# Patient Record
Sex: MALE | Race: WHITE | NOT HISPANIC OR LATINO | Employment: FULL TIME | URBAN - METROPOLITAN AREA
[De-identification: names, ages, dates, MRNs, and addresses within clinical notes are randomized per-mention and may not be internally consistent; named-entity substitution may affect disease eponyms.]

---

## 2023-12-25 ENCOUNTER — HOSPITAL ENCOUNTER (EMERGENCY)
Facility: HOSPITAL | Age: 68
Discharge: HOME | End: 2023-12-25
Attending: EMERGENCY MEDICINE
Payer: COMMERCIAL

## 2023-12-25 ENCOUNTER — APPOINTMENT (EMERGENCY)
Dept: RADIOLOGY | Facility: HOSPITAL | Age: 68
End: 2023-12-25
Payer: COMMERCIAL

## 2023-12-25 VITALS
BODY MASS INDEX: 27.3 KG/M2 | WEIGHT: 195 LBS | SYSTOLIC BLOOD PRESSURE: 172 MMHG | OXYGEN SATURATION: 98 % | RESPIRATION RATE: 12 BRPM | DIASTOLIC BLOOD PRESSURE: 77 MMHG | HEART RATE: 52 BPM | TEMPERATURE: 98 F | HEIGHT: 71 IN

## 2023-12-25 DIAGNOSIS — T07.XXXA MULTIPLE PUNCTURE WOUNDS: ICD-10-CM
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
## 2023-12-25 DIAGNOSIS — T07.XXXA MULTIPLE LACERATIONS: ICD-10-CM

## 2023-12-25 PROCEDURE — 73130 X-RAY EXAM OF HAND: CPT

## 2023-12-25 PROCEDURE — 12002 RPR S/N/AX/GEN/TRNK2.6-7.5CM: CPT | Performed by: PHYSICIAN ASSISTANT

## 2023-12-25 PROCEDURE — 99284 EMERGENCY DEPT VISIT MOD MDM: CPT | Performed by: PHYSICIAN ASSISTANT

## 2023-12-25 PROCEDURE — 99283 EMERGENCY DEPT VISIT LOW MDM: CPT

## 2023-12-25 RX ORDER — AMOXICILLIN AND CLAVULANATE POTASSIUM 875; 125 MG/1; MG/1
1 TABLET, FILM COATED ORAL ONCE
Status: COMPLETED | OUTPATIENT
Start: 2023-12-25 | End: 2023-12-25

## 2023-12-25 RX ORDER — LIDOCAINE HYDROCHLORIDE AND EPINEPHRINE 10; 10 MG/ML; UG/ML
20 INJECTION, SOLUTION INFILTRATION; PERINEURAL ONCE
Status: COMPLETED | OUTPATIENT
Start: 2023-12-25 | End: 2023-12-25

## 2023-12-25 RX ORDER — IBUPROFEN 600 MG/1
600 TABLET ORAL ONCE
Status: COMPLETED | OUTPATIENT
Start: 2023-12-25 | End: 2023-12-25

## 2023-12-25 RX ORDER — AMOXICILLIN AND CLAVULANATE POTASSIUM 875; 125 MG/1; MG/1
1 TABLET, FILM COATED ORAL EVERY 12 HOURS
Qty: 14 TABLET | Refills: 0 | Status: ON HOLD | OUTPATIENT
Start: 2023-12-25 | End: 2023-12-28

## 2023-12-25 RX ADMIN — LIDOCAINE HYDROCHLORIDE,EPINEPHRINE BITARTRATE 20 ML: 10; .01 INJECTION, SOLUTION INFILTRATION; PERINEURAL at 18:11

## 2023-12-25 RX ADMIN — AMOXICILLIN AND CLAVULANATE POTASSIUM 1 TABLET: 875; 125 TABLET, FILM COATED ORAL at 18:12

## 2023-12-25 RX ADMIN — IBUPROFEN 600 MG: 600 TABLET, FILM COATED ORAL at 18:12

## 2023-12-25 NOTE — ED PROVIDER NOTES
History  Chief Complaint   Patient presents with    Dog Bite     Received multpiple  dog bites on both hands.  States dog is utd with vaccines.  States it was his own dog     Past Medical History: Hypertension, Stroke (HCC)  Past Surgical History: HERNIA REPAIR, left wrist surgery with hardware    Patient presents to ED c/o multiple dog bites, punctures, scratches to bilateral hands and wrists, worse on right after family Labrador dog went after the owner's boyfriend, so patient tried to get the dog off him and dog bite patient.  Dog is up-to-date on vaccines  Patient got a tetanus prior to arrival as he went to an urgent care and then was referred to emergency department; they also sent a prescription for Augmentin for patient.  PT has no other injuries or complaints.  Initially washed with H2O2          None       Past Medical History:   Diagnosis Date    Hypertension     Stroke (HCC)        Past Surgical History:   Procedure Laterality Date    HERNIA REPAIR         No family history on file.  I have reviewed and agree with the history as documented.    E-Cigarette/Vaping     E-Cigarette/Vaping Substances          Review of Systems   Constitutional:  Negative for fever.   HENT:  Negative for sore throat.    Respiratory:  Negative for shortness of breath.    Cardiovascular:  Negative for chest pain.   Gastrointestinal:  Negative for abdominal pain and vomiting.   Musculoskeletal:  Positive for arthralgias and myalgias.   Skin:  Positive for wound. Negative for color change and pallor.   Neurological:  Negative for dizziness, weakness and numbness.   All other systems reviewed and are negative.      Physical Exam  Physical Exam  Vitals and nursing note reviewed.   Constitutional:       General: He is in acute distress (mild).      Appearance: He is well-developed.   HENT:      Head: Normocephalic and atraumatic.      Right Ear: External ear normal.      Left Ear: External ear normal.      Nose: Nose normal.       "Mouth/Throat:      Mouth: Mucous membranes are moist.      Pharynx: Oropharynx is clear.   Eyes:      Conjunctiva/sclera: Conjunctivae normal.   Cardiovascular:      Rate and Rhythm: Normal rate.   Pulmonary:      Effort: Pulmonary effort is normal. No respiratory distress.   Musculoskeletal:         General: Swelling, tenderness and signs of injury present. Normal range of motion.      Cervical back: Normal range of motion.   Skin:     General: Skin is warm and dry.      Findings: Lesion present.   Neurological:      Mental Status: He is alert and oriented to person, place, and time.   Psychiatric:         Behavior: Behavior normal.               Vital Signs  ED Triage Vitals [12/25/23 1734]   Temperature Pulse Respirations Blood Pressure SpO2   98 °F (36.7 °C) (!) 52 12 (!) 172/77 98 %      Temp src Heart Rate Source Patient Position - Orthostatic VS BP Location FiO2 (%)   -- Monitor Lying Left arm --      Pain Score       3           Vitals:    12/25/23 1734   BP: (!) 172/77   Pulse: (!) 52   Patient Position - Orthostatic VS: Lying         Visual Acuity      ED Medications  Medications   ibuprofen (MOTRIN) tablet 600 mg (600 mg Oral Given 12/25/23 1812)   amoxicillin-clavulanate (AUGMENTIN) 875-125 mg per tablet 1 tablet (1 tablet Oral Given 12/25/23 1812)   lidocaine-epinephrine (XYLOCAINE/EPINEPHRINE) 1 %-1:100,000 injection 20 mL (20 mL Infiltration Given 12/25/23 1811)       Diagnostic Studies  Results Reviewed       None                   XR hand 3+ views RIGHT   ED Interpretation by Rosa Maria Lopez PA-C (12/25 1925)   No fx no fb      XR hand 3+ views LEFT   ED Interpretation by Rosa Maria Lopez PA-C (12/25 1925)   No fx no fb                 Procedures  Universal Protocol:  Consent: Verbal consent obtained.  Risks and benefits: risks, benefits and alternatives were discussed  Consent given by: patient  Time out: Immediately prior to procedure a \"time out\" was called to verify the correct patient, " procedure, equipment, support staff and site/side marked as required.  Patient understanding: patient states understanding of the procedure being performed  Patient identity confirmed: verbally with patient  Laceration repair    Date/Time: 12/25/2023 7:51 PM    Performed by: Rosa Maria Lopez PA-C  Authorized by: Rosa Maria Lopez PA-C  Location: #3 wounds to right hand and #1 wound to left palm.  Wound length (cm): 5cm total:  R hand: 1cm index finger, 1cm mid dorsal hand, 2 cm dorsal wrist and Left hand 1 cm palm.  Foreign bodies: no foreign bodies  Tendon involvement: none  Nerve involvement: none  Vascular damage: no  Anesthesia: local infiltration    Anesthesia:  Local Anesthetic: lidocaine 1% with epinephrine  Anesthetic total: 3 mL    Sedation:  Patient sedated: no        Procedure Details:  Preparation: Patient was prepped and draped in the usual sterile fashion.  Irrigation solution: saline  Irrigation method: syringe  Amount of cleaning: extensive  Skin closure: 5-0 Prolene  Number of sutures: 5 (R hand: #4 total (#1 to index, #1 to midhand and #2 to wrist) and L hand #1)  Technique: simple  Approximation: loose  Approximation difficulty: simple  Dressing: gauze roll  Patient tolerance: patient tolerated the procedure well with no immediate complications               ED Course                               SBIRT 22yo+      Flowsheet Row Most Recent Value   Initial Alcohol Screen: US AUDIT-C     1. How often do you have a drink containing alcohol? 0 Filed at: 12/25/2023 1736   2. How many drinks containing alcohol do you have on a typical day you are drinking?  0 Filed at: 12/25/2023 1736   3a. Male UNDER 65: How often do you have five or more drinks on one occasion? 0 Filed at: 12/25/2023 1736   Audit-C Score 0 Filed at: 12/25/2023 1736   [place]: How many times in the past year have you...    Used an illegal drug or used a prescription medication for non-medical reasons? Never Filed at: 12/25/2023 1736                       Medical Decision Making  Right handed  Patient with multiple dog bites, punctures, lacerations, abrasions  Wounds cleaned, deeper lacerations copiously irrigated.  Loosely sutured to aid in healing but also allow drainage.  Strict wound care precautions given to patient, follow-up with PCP for wound check in 2 to 3 days  No indication for rabies at this time    Amount and/or Complexity of Data Reviewed  Radiology: ordered and independent interpretation performed. Decision-making details documented in ED Course.    Risk  OTC drugs.  Prescription drug management.             Disposition  Final diagnoses:   Dog bite, initial encounter - B/L hands   Multiple lacerations   Multiple puncture wounds     Time reflects when diagnosis was documented in both MDM as applicable and the Disposition within this note       Time User Action Codes Description Comment    12/25/2023  7:53 PM Rosa Maria Lopez [W54.0XXA] Dog bite, initial encounter     12/25/2023  7:53 PM Rosa Maria Lopez Modify [W54.0XXA] Dog bite, initial encounter B/L hands    12/25/2023  7:54 PM Rosa Maria Lopez [T07.XXXA] Multiple lacerations     12/25/2023  7:54 PM Rosa Maria Lopez [T07.XXXA] Multiple puncture wounds           ED Disposition       ED Disposition   Discharge    Condition   Stable    Date/Time   Mon Dec 25, 2023 1953    Comment   Raji Goldman discharge to home/self care.                   Follow-up Information       Follow up With Specialties Details Why Contact Info    Your PCP   For wound re-check in 2-3 days and then in 5 days for suture removal             Patient's Medications   Discharge Prescriptions    AMOXICILLIN-CLAVULANATE (AUGMENTIN) 875-125 MG PER TABLET    Take 1 tablet by mouth every 12 (twelve) hours for 7 days       Start Date: 12/25/2023End Date: 1/1/2024       Order Dose: 1 tablet       Quantity: 14 tablet    Refills: 0       No discharge procedures on file.    PDMP Review       None            ED  Provider  Electronically Signed by             Rosa Maria Lopez PA-C  12/25/23 2009

## 2023-12-26 NOTE — DISCHARGE INSTRUCTIONS
Use Tylenol 650 mg every 4 hours or Motrin 600 mg every 6 hours; you can alternate the 2 medications taking something every 3 hours for pain as needed.    Take all oral antibiotics until done.    Wound check in 2-3 days, then in 5 days for suture removal    Remove dressing in 24 hours, then wash gently with soap and water pat drying keep covered with antibiotic ointment and dressing.    Return to ED if worsening pain, redness, purulent discharge or worsening condition or any concerns

## 2023-12-28 ENCOUNTER — ANESTHESIA EVENT (EMERGENCY)
Dept: PERIOP | Facility: HOSPITAL | Age: 68
End: 2023-12-28
Payer: COMMERCIAL

## 2023-12-28 ENCOUNTER — HOSPITAL ENCOUNTER (EMERGENCY)
Facility: HOSPITAL | Age: 68
Discharge: HOME | End: 2023-12-28
Attending: EMERGENCY MEDICINE
Payer: COMMERCIAL

## 2023-12-28 ENCOUNTER — APPOINTMENT (EMERGENCY)
Dept: RADIOLOGY | Facility: HOSPITAL | Age: 68
End: 2023-12-28
Payer: COMMERCIAL

## 2023-12-28 ENCOUNTER — ANESTHESIA (EMERGENCY)
Dept: PERIOP | Facility: HOSPITAL | Age: 68
End: 2023-12-28
Payer: COMMERCIAL

## 2023-12-28 ENCOUNTER — HOSPITAL ENCOUNTER (OUTPATIENT)
Facility: HOSPITAL | Age: 68
Setting detail: OUTPATIENT SURGERY
Discharge: HOME | End: 2023-12-30
Attending: EMERGENCY MEDICINE | Admitting: ORTHOPAEDIC SURGERY
Payer: COMMERCIAL

## 2023-12-28 VITALS
DIASTOLIC BLOOD PRESSURE: 56 MMHG | RESPIRATION RATE: 16 BRPM | HEART RATE: 78 BPM | HEIGHT: 71 IN | OXYGEN SATURATION: 95 % | TEMPERATURE: 98 F | BODY MASS INDEX: 26.6 KG/M2 | SYSTOLIC BLOOD PRESSURE: 123 MMHG | WEIGHT: 190 LBS

## 2023-12-28 DIAGNOSIS — S52.209A ULNAR FRACTURE: ICD-10-CM

## 2023-12-28 DIAGNOSIS — S41.112A LACERATION OF ARM, LEFT, MULTIPLE SITES, INITIAL ENCOUNTER: ICD-10-CM
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
## 2023-12-28 DIAGNOSIS — S52.692B: Primary | ICD-10-CM

## 2023-12-28 DIAGNOSIS — S41.159A DOG BITE OF ARM: Primary | ICD-10-CM

## 2023-12-28 DIAGNOSIS — S41.109A: ICD-10-CM

## 2023-12-28 PROBLEM — S52.202B TYPE I OR II OPEN FRACTURE OF LEFT ULNA: Status: ACTIVE | Noted: 2023-12-28

## 2023-12-28 LAB
ANION GAP SERPL CALCULATED.3IONS-SCNC: 9 MMOL/L
BASOPHILS # BLD AUTO: 0.01 THOUSANDS/ÂΜL (ref 0–0.1)
BASOPHILS NFR BLD AUTO: 0 % (ref 0–1)
BUN SERPL-MCNC: 17 MG/DL (ref 5–25)
CALCIUM SERPL-MCNC: 9.1 MG/DL (ref 8.4–10.2)
CHLORIDE SERPL-SCNC: 105 MMOL/L (ref 96–108)
CO2 SERPL-SCNC: 26 MMOL/L (ref 21–32)
CREAT SERPL-MCNC: 1.01 MG/DL (ref 0.6–1.3)
EOSINOPHIL # BLD AUTO: 0.01 THOUSAND/ÂΜL (ref 0–0.61)
EOSINOPHIL NFR BLD AUTO: 0 % (ref 0–6)
ERYTHROCYTE [DISTWIDTH] IN BLOOD BY AUTOMATED COUNT: 13.8 % (ref 11.6–15.1)
GFR SERPL CREATININE-BSD FRML MDRD: 77 ML/MIN/1.73SQ M
GLUCOSE SERPL-MCNC: 155 MG/DL (ref 65–140)
HCT VFR BLD AUTO: 38.2 % (ref 36.5–49.3)
HGB BLD-MCNC: 12.8 G/DL (ref 12–17)
IMM GRANULOCYTES # BLD AUTO: 0.02 THOUSAND/UL (ref 0–0.2)
IMM GRANULOCYTES NFR BLD AUTO: 0 % (ref 0–2)
LYMPHOCYTES # BLD AUTO: 0.57 THOUSANDS/ÂΜL (ref 0.6–4.47)
LYMPHOCYTES NFR BLD AUTO: 6 % (ref 14–44)
MCH RBC QN AUTO: 30 PG (ref 26.8–34.3)
MCHC RBC AUTO-ENTMCNC: 33.5 G/DL (ref 31.4–37.4)
MCV RBC AUTO: 90 FL (ref 82–98)
MONOCYTES # BLD AUTO: 0.38 THOUSAND/ÂΜL (ref 0.17–1.22)
MONOCYTES NFR BLD AUTO: 4 % (ref 4–12)
NEUTROPHILS # BLD AUTO: 8.8 THOUSANDS/ÂΜL (ref 1.85–7.62)
NEUTS SEG NFR BLD AUTO: 90 % (ref 43–75)
NRBC BLD AUTO-RTO: 0 /100 WBCS
PLATELET # BLD AUTO: 186 THOUSANDS/UL (ref 149–390)
PMV BLD AUTO: 8.7 FL (ref 8.9–12.7)
POTASSIUM SERPL-SCNC: 3.6 MMOL/L (ref 3.5–5.3)
RBC # BLD AUTO: 4.26 MILLION/UL (ref 3.88–5.62)
SODIUM SERPL-SCNC: 140 MMOL/L (ref 135–147)
WBC # BLD AUTO: 9.79 THOUSAND/UL (ref 4.31–10.16)

## 2023-12-28 PROCEDURE — C1713 ANCHOR/SCREW BN/BN,TIS/BN: HCPCS | Performed by: ORTHOPAEDIC SURGERY

## 2023-12-28 PROCEDURE — 73110 X-RAY EXAM OF WRIST: CPT

## 2023-12-28 PROCEDURE — 25652 OPTX ULNAR STYLOID FRACTURE: CPT | Performed by: ORTHOPAEDIC SURGERY

## 2023-12-28 PROCEDURE — 99223 1ST HOSP IP/OBS HIGH 75: CPT | Performed by: ORTHOPAEDIC SURGERY

## 2023-12-28 PROCEDURE — 85025 COMPLETE CBC W/AUTO DIFF WBC: CPT | Performed by: EMERGENCY MEDICINE

## 2023-12-28 PROCEDURE — 99283 EMERGENCY DEPT VISIT LOW MDM: CPT

## 2023-12-28 PROCEDURE — 73100 X-RAY EXAM OF WRIST: CPT

## 2023-12-28 PROCEDURE — 73090 X-RAY EXAM OF FOREARM: CPT

## 2023-12-28 PROCEDURE — 12031 INTMD RPR S/A/T/EXT 2.5 CM/<: CPT | Performed by: ORTHOPAEDIC SURGERY

## 2023-12-28 PROCEDURE — 99223 1ST HOSP IP/OBS HIGH 75: CPT | Performed by: INTERNAL MEDICINE

## 2023-12-28 PROCEDURE — 96365 THER/PROPH/DIAG IV INF INIT: CPT

## 2023-12-28 PROCEDURE — 99285 EMERGENCY DEPT VISIT HI MDM: CPT | Performed by: EMERGENCY MEDICINE

## 2023-12-28 PROCEDURE — 12031 INTMD RPR S/A/T/EXT 2.5 CM/<: CPT | Performed by: PHYSICIAN ASSISTANT

## 2023-12-28 PROCEDURE — 99291 CRITICAL CARE FIRST HOUR: CPT | Performed by: EMERGENCY MEDICINE

## 2023-12-28 PROCEDURE — 36415 COLL VENOUS BLD VENIPUNCTURE: CPT | Performed by: EMERGENCY MEDICINE

## 2023-12-28 PROCEDURE — 25652 OPTX ULNAR STYLOID FRACTURE: CPT | Performed by: PHYSICIAN ASSISTANT

## 2023-12-28 PROCEDURE — C9290 INJ, BUPIVACAINE LIPOSOME: HCPCS | Performed by: STUDENT IN AN ORGANIZED HEALTH CARE EDUCATION/TRAINING PROGRAM

## 2023-12-28 PROCEDURE — 80048 BASIC METABOLIC PNL TOTAL CA: CPT | Performed by: EMERGENCY MEDICINE

## 2023-12-28 PROCEDURE — 12002 RPR S/N/AX/GEN/TRNK2.6-7.5CM: CPT | Performed by: EMERGENCY MEDICINE

## 2023-12-28 DEVICE — NARROW LOCKING T-PLATE, 2.4
Type: IMPLANTABLE DEVICE | Site: WRIST | Status: FUNCTIONAL
Brand: VARIAX

## 2023-12-28 DEVICE — BONE SCREW, FULLY THREADED, T8
Type: IMPLANTABLE DEVICE | Site: WRIST | Status: FUNCTIONAL
Brand: VARIAX

## 2023-12-28 DEVICE — BIOACTIVE BONE GRAFT SUBSTITUTE, FOAM PACK; BETA-TRICALCIUM PHOSPHATE, TYPE I BOVINE COLLAGEN, AND BIOACTIVE GLASS
Type: IMPLANTABLE DEVICE | Site: WRIST | Status: FUNCTIONAL
Brand: VITOSS BBTRAUMA

## 2023-12-28 DEVICE — GRAFT BONE CANCELLOUS CHIPS 30ML: Type: IMPLANTABLE DEVICE | Site: WRIST | Status: FUNCTIONAL

## 2023-12-28 DEVICE — LOCKING SCREW, FULLY THREADED,T8
Type: IMPLANTABLE DEVICE | Site: WRIST | Status: FUNCTIONAL
Brand: VARIAX

## 2023-12-28 RX ORDER — OXYCODONE HYDROCHLORIDE 5 MG/1
5 TABLET ORAL EVERY 4 HOURS PRN
Status: DISCONTINUED | OUTPATIENT
Start: 2023-12-28 | End: 2023-12-30 | Stop reason: HOSPADM

## 2023-12-28 RX ORDER — OXYCODONE HYDROCHLORIDE 5 MG/1
5 TABLET ORAL EVERY 4 HOURS PRN
Qty: 30 TABLET | Refills: 0 | Status: SHIPPED | OUTPATIENT
Start: 2023-12-28 | End: 2024-01-02

## 2023-12-28 RX ORDER — EPHEDRINE SULFATE 50 MG/ML
INJECTION INTRAVENOUS AS NEEDED
Status: DISCONTINUED | OUTPATIENT
Start: 2023-12-28 | End: 2023-12-28

## 2023-12-28 RX ORDER — CHLORHEXIDINE GLUCONATE ORAL RINSE 1.2 MG/ML
15 SOLUTION DENTAL ONCE
Status: COMPLETED | OUTPATIENT
Start: 2023-12-28 | End: 2023-12-28

## 2023-12-28 RX ORDER — MAGNESIUM HYDROXIDE/ALUMINUM HYDROXICE/SIMETHICONE 120; 1200; 1200 MG/30ML; MG/30ML; MG/30ML
30 SUSPENSION ORAL EVERY 6 HOURS PRN
Status: DISCONTINUED | OUTPATIENT
Start: 2023-12-28 | End: 2023-12-30 | Stop reason: HOSPADM

## 2023-12-28 RX ORDER — AMOXICILLIN AND CLAVULANATE POTASSIUM 875; 125 MG/1; MG/1
1 TABLET, FILM COATED ORAL EVERY 12 HOURS
Qty: 14 TABLET | Refills: 0 | Status: SHIPPED | OUTPATIENT
Start: 2023-12-28 | End: 2023-12-30

## 2023-12-28 RX ORDER — CHLORHEXIDINE GLUCONATE 4 G/100ML
SOLUTION TOPICAL DAILY PRN
Status: DISCONTINUED | OUTPATIENT
Start: 2023-12-28 | End: 2023-12-28 | Stop reason: HOSPADM

## 2023-12-28 RX ORDER — DEXAMETHASONE SODIUM PHOSPHATE 10 MG/ML
INJECTION, SOLUTION INTRAMUSCULAR; INTRAVENOUS AS NEEDED
Status: DISCONTINUED | OUTPATIENT
Start: 2023-12-28 | End: 2023-12-28

## 2023-12-28 RX ORDER — LIDOCAINE HYDROCHLORIDE 10 MG/ML
INJECTION, SOLUTION EPIDURAL; INFILTRATION; INTRACAUDAL; PERINEURAL AS NEEDED
Status: DISCONTINUED | OUTPATIENT
Start: 2023-12-28 | End: 2023-12-28

## 2023-12-28 RX ORDER — MAGNESIUM HYDROXIDE 1200 MG/15ML
LIQUID ORAL AS NEEDED
Status: DISCONTINUED | OUTPATIENT
Start: 2023-12-28 | End: 2023-12-28 | Stop reason: HOSPADM

## 2023-12-28 RX ORDER — PROMETHAZINE HYDROCHLORIDE 25 MG/ML
6.25 INJECTION, SOLUTION INTRAMUSCULAR; INTRAVENOUS ONCE AS NEEDED
Status: DISCONTINUED | OUTPATIENT
Start: 2023-12-28 | End: 2023-12-28 | Stop reason: HOSPADM

## 2023-12-28 RX ORDER — ROCURONIUM BROMIDE 10 MG/ML
INJECTION, SOLUTION INTRAVENOUS AS NEEDED
Status: DISCONTINUED | OUTPATIENT
Start: 2023-12-28 | End: 2023-12-28

## 2023-12-28 RX ORDER — PROPOFOL 10 MG/ML
INJECTION, EMULSION INTRAVENOUS AS NEEDED
Status: DISCONTINUED | OUTPATIENT
Start: 2023-12-28 | End: 2023-12-28

## 2023-12-28 RX ORDER — ACETAMINOPHEN 325 MG/1
650 TABLET ORAL ONCE
Status: COMPLETED | OUTPATIENT
Start: 2023-12-28 | End: 2023-12-28

## 2023-12-28 RX ORDER — ACETAMINOPHEN 325 MG/1
650 TABLET ORAL EVERY 6 HOURS PRN
Status: DISCONTINUED | OUTPATIENT
Start: 2023-12-28 | End: 2023-12-29 | Stop reason: SDUPTHER

## 2023-12-28 RX ORDER — SODIUM CHLORIDE, SODIUM LACTATE, POTASSIUM CHLORIDE, CALCIUM CHLORIDE 600; 310; 30; 20 MG/100ML; MG/100ML; MG/100ML; MG/100ML
INJECTION, SOLUTION INTRAVENOUS CONTINUOUS PRN
Status: DISCONTINUED | OUTPATIENT
Start: 2023-12-28 | End: 2023-12-28

## 2023-12-28 RX ORDER — BUPIVACAINE HYDROCHLORIDE 5 MG/ML
INJECTION, SOLUTION EPIDURAL; INTRACAUDAL
Status: COMPLETED | OUTPATIENT
Start: 2023-12-28 | End: 2023-12-28

## 2023-12-28 RX ORDER — SUCCINYLCHOLINE/SOD CL,ISO/PF 100 MG/5ML
SYRINGE (ML) INTRAVENOUS AS NEEDED
Status: DISCONTINUED | OUTPATIENT
Start: 2023-12-28 | End: 2023-12-28

## 2023-12-28 RX ORDER — MIDAZOLAM HYDROCHLORIDE 2 MG/2ML
INJECTION, SOLUTION INTRAMUSCULAR; INTRAVENOUS AS NEEDED
Status: DISCONTINUED | OUTPATIENT
Start: 2023-12-28 | End: 2023-12-28

## 2023-12-28 RX ORDER — VANCOMYCIN HYDROCHLORIDE 1 G/20ML
INJECTION, POWDER, LYOPHILIZED, FOR SOLUTION INTRAVENOUS AS NEEDED
Status: DISCONTINUED | OUTPATIENT
Start: 2023-12-28 | End: 2023-12-28 | Stop reason: HOSPADM

## 2023-12-28 RX ORDER — ONDANSETRON 2 MG/ML
4 INJECTION INTRAMUSCULAR; INTRAVENOUS EVERY 6 HOURS PRN
Status: DISCONTINUED | OUTPATIENT
Start: 2023-12-28 | End: 2023-12-30 | Stop reason: HOSPADM

## 2023-12-28 RX ORDER — ONDANSETRON 2 MG/ML
INJECTION INTRAMUSCULAR; INTRAVENOUS AS NEEDED
Status: DISCONTINUED | OUTPATIENT
Start: 2023-12-28 | End: 2023-12-28

## 2023-12-28 RX ORDER — FENTANYL CITRATE 50 UG/ML
INJECTION, SOLUTION INTRAMUSCULAR; INTRAVENOUS AS NEEDED
Status: DISCONTINUED | OUTPATIENT
Start: 2023-12-28 | End: 2023-12-28

## 2023-12-28 RX ORDER — FENTANYL CITRATE/PF 50 MCG/ML
25 SYRINGE (ML) INJECTION
Status: DISCONTINUED | OUTPATIENT
Start: 2023-12-28 | End: 2023-12-28 | Stop reason: HOSPADM

## 2023-12-28 RX ADMIN — ROCURONIUM BROMIDE 30 MG: 10 INJECTION, SOLUTION INTRAVENOUS at 13:16

## 2023-12-28 RX ADMIN — SODIUM CHLORIDE, SODIUM LACTATE, POTASSIUM CHLORIDE, AND CALCIUM CHLORIDE: .6; .31; .03; .02 INJECTION, SOLUTION INTRAVENOUS at 12:42

## 2023-12-28 RX ADMIN — SUGAMMADEX 200 MG: 100 INJECTION, SOLUTION INTRAVENOUS at 15:07

## 2023-12-28 RX ADMIN — PROPOFOL 200 MG: 10 INJECTION, EMULSION INTRAVENOUS at 13:03

## 2023-12-28 RX ADMIN — BUPIVACAINE 20 ML: 13.3 INJECTION, SUSPENSION, LIPOSOMAL INFILTRATION at 12:36

## 2023-12-28 RX ADMIN — DEXAMETHASONE SODIUM PHOSPHATE 10 MG: 10 INJECTION, SOLUTION INTRAMUSCULAR; INTRAVENOUS at 13:05

## 2023-12-28 RX ADMIN — MIDAZOLAM HYDROCHLORIDE 2 MG: 1 INJECTION, SOLUTION INTRAMUSCULAR; INTRAVENOUS at 12:35

## 2023-12-28 RX ADMIN — PROPOFOL 50 MG: 10 INJECTION, EMULSION INTRAVENOUS at 15:24

## 2023-12-28 RX ADMIN — Medication 100 MG: at 13:03

## 2023-12-28 RX ADMIN — SODIUM CHLORIDE 12 MCG: 9 INJECTION, SOLUTION INTRAVENOUS at 13:12

## 2023-12-28 RX ADMIN — CHLORHEXIDINE GLUCONATE 0.12% ORAL RINSE 15 ML: 1.2 LIQUID ORAL at 12:40

## 2023-12-28 RX ADMIN — SODIUM CHLORIDE 8 MCG: 9 INJECTION, SOLUTION INTRAVENOUS at 14:37

## 2023-12-28 RX ADMIN — EPHEDRINE SULFATE 10 MG: 50 INJECTION, SOLUTION INTRAVENOUS at 13:29

## 2023-12-28 RX ADMIN — PHENYLEPHRINE HYDROCHLORIDE 20 MCG/MIN: 10 INJECTION INTRAVENOUS at 13:36

## 2023-12-28 RX ADMIN — FENTANYL CITRATE 100 MCG: 50 INJECTION, SOLUTION INTRAMUSCULAR; INTRAVENOUS at 13:03

## 2023-12-28 RX ADMIN — SODIUM CHLORIDE, SODIUM LACTATE, POTASSIUM CHLORIDE, AND CALCIUM CHLORIDE: .6; .31; .03; .02 INJECTION, SOLUTION INTRAVENOUS at 15:13

## 2023-12-28 RX ADMIN — ONDANSETRON 4 MG: 2 INJECTION INTRAMUSCULAR; INTRAVENOUS at 13:39

## 2023-12-28 RX ADMIN — AMPICILLIN SODIUM AND SULBACTAM SODIUM 3 G: 2; 1 INJECTION, POWDER, FOR SOLUTION INTRAMUSCULAR; INTRAVENOUS at 11:40

## 2023-12-28 RX ADMIN — LIDOCAINE HYDROCHLORIDE 50 MG: 10 INJECTION, SOLUTION EPIDURAL; INFILTRATION; INTRACAUDAL at 13:03

## 2023-12-28 RX ADMIN — BUPIVACAINE HYDROCHLORIDE 10 ML: 5 INJECTION, SOLUTION EPIDURAL; INTRACAUDAL; PERINEURAL at 12:36

## 2023-12-28 RX ADMIN — SODIUM CHLORIDE 12 MCG: 9 INJECTION, SOLUTION INTRAVENOUS at 13:20

## 2023-12-28 RX ADMIN — ACETAMINOPHEN 650 MG: 325 TABLET, FILM COATED ORAL at 10:59

## 2023-12-28 NOTE — ANESTHESIA PROCEDURE NOTES
Peripheral Block    Patient location during procedure: holding area  Start time: 12/28/2023 12:36 PM  Reason for block: at surgeon's request and post-op pain management  Staffing  Performed by: Dominik Tsang MD  Authorized by: Dominik Tsang MD    Preanesthetic Checklist  Completed: patient identified, IV checked, site marked, risks and benefits discussed, monitors and equipment checked and timeout performed  Peripheral Block  Patient position: supine  Prep: ChloraPrep  Patient monitoring: heart rate, continuous pulse ox and frequent blood pressure checks  Block type: infraclavicular  Laterality: left  Injection technique: catheter and single-shot  Procedures: ultrasound guided, Ultrasound guidance required for the procedure to increase accuracy and safety of medication placement and decrease risk of complications.  Ultrasound permanent image savedbupivacaine (PF) (MARCAINE) 0.5 % injection 20 mL - Perineural   10 mL - 12/28/2023 12:36:00 PM  Needle  Needle type: Stimuplex   Needle gauge: 20 G  Needle length: 4 in  Needle localization: ultrasound guidance  Catheter size: 20G.  Assessment  Injection assessment: local visualized surrounding nerve on ultrasound, negative aspiration for heme and no paresthesia on injection  Paresthesia pain: none  patient tolerated the procedure well with no immediate complications  Additional Notes  Uncomplicated infraclavicular block  Dose: 20ml exparel + 10ml 0.5% bupivacaine

## 2023-12-28 NOTE — ANESTHESIA POSTPROCEDURE EVALUATION
Post-Op Assessment Note    CV Status:  Stable  Pain Score: 0    Pain management: adequate    Multimodal analgesia used between 6 hours prior to anesthesia start to PACU discharge    Mental Status:  Alert and awake   Hydration Status:  Euvolemic   PONV Controlled:  Controlled   Airway Patency:  Patent     Post Op Vitals Reviewed: Yes      Staff: CRNA               BP   114/55   Temp 97.3   Pulse 67   Resp 14   SpO2 98

## 2023-12-28 NOTE — ANESTHESIA PREPROCEDURE EVALUATION
"Procedure:  OPEN REDUCTION W/ INTERNAL FIXATION (ORIF) RADIUS / ULNA (WRIST)- left distal ulna, irrigation and debridement and all necessar procedures (Left: Wrist)    Relevant Problems   No relevant active problems      Past Medical History:   Diagnosis Date    Hypertension     Stroke (HCC)      No results found for: \"WBC\", \"HGB\", \"HCT\", \"MCV\", \"PLT\"    Physical Exam    Airway    Mallampati score: III  TM Distance: >3 FB  Neck ROM: full     Dental   No notable dental hx     Cardiovascular  Rhythm: regular, Rate: normal    Pulmonary   Breath sounds clear to auscultation    Other Findings  Intercisor Distance > 3cm          Anesthesia Plan  ASA Score- 2     Anesthesia Type- general with ASA Monitors.         Additional Monitors:     Airway Plan: ETT.    Comment: Discussed benefits/risks of general anesthesia including possibility of mouth/throat pain, injury to lips/teeth, nausea/vomiting, and surgical pain along with more rare complications such as stroke, MI, pneumonia, aspiration, and injury to blood vessels. All questions answered.    Discussed nerve block to assist with post-operative analgesia. Discussed possibility of uncommon complications including permanent nerve injury, damage to blood vessels, infection local anesthetic toxicity, and nerve block failure. Patient understands and wishes to proceed.       .       Plan Factors-Exercise tolerance (METS): >4 METS.    Chart reviewed. EKG reviewed.  Existing labs reviewed.                   Induction- intravenous and rapid sequence induction.    Postoperative Plan- Plan for postoperative opioid use. Planned trial extubation    Informed Consent- Anesthetic plan and risks discussed with patient.  I personally reviewed this patient with the CRNA. Discussed and agreed on the Anesthesia Plan with the CRNA..                "

## 2023-12-28 NOTE — H&P
Orthopedics   Raji Goldman 68 y.o. male MRN: [id number]  Unit/Bed#: ED 07      Chief Complaint:   Left wrist pain    HPI:  68 y.o. male right-hand-dominant male presents for left wrist injury.  His dog was trying to bite another person and he went to stop the dog.  He was bit on the right leg and left wrist.  This is a Labrador and a family animal who is vaccinated.  Patient was seen 3 days ago for similar incidence of dog bites from the same dog and received a tetanus update and Augmentin prescription.  He reports pain in the left wrist and inability to move the wrist.  He is right-hand dominant.  Denies numbness or tingling.  No pain in the fingers.  He has a history of left distal radius ORIF.  He is not diabetic.  Does not take blood thinners.  He has high blood pressure.    Review Of Systems:   Skin: Wounds over the right lower leg and bilateral hands  Neuro: See HPI  Musculoskeletal: See HPI  14 point review of systems negative except as stated above     Past Medical History:   Past Medical History:   Diagnosis Date    Hypertension     Stroke (HCC)        Past Surgical History:   Past Surgical History:   Procedure Laterality Date    HERNIA REPAIR         Family History:  Family history reviewed and non-contributory  History reviewed. No pertinent family history.    Social History:  Social History     Socioeconomic History    Marital status: Single     Spouse name: None    Number of children: None    Years of education: None    Highest education level: None   Occupational History    None   Tobacco Use    Smoking status: Never    Smokeless tobacco: Never   Vaping Use    Vaping status: Never Used   Substance and Sexual Activity    Alcohol use: Not Currently    Drug use: Never    Sexual activity: None   Other Topics Concern    None   Social History Narrative    None     Social Determinants of Health     Financial Resource Strain: Not on file   Food Insecurity: Not on file   Transportation Needs: Not on file  "  Physical Activity: Not on file   Stress: Not on file   Social Connections: Not on file   Intimate Partner Violence: Not on file   Housing Stability: Not on file       Allergies:   No Known Allergies        Labs:  No results found for: \"HCT\", \"HGB\", \"PT\", \"INR\", \"WBC\", \"ESR\", \"CRP\"    Meds:    Current Facility-Administered Medications:     ampicillin-sulbactam (UNASYN) 3 g in sodium chloride 0.9 % 100 mL IVPB, 3 g, Intravenous, Once, Marco Ramirez,     Current Outpatient Medications:     amoxicillin-clavulanate (AUGMENTIN) 875-125 mg per tablet, Take 1 tablet by mouth every 12 (twelve) hours for 7 days, Disp: 14 tablet, Rfl: 0    Blood Culture:   No results found for: \"BLOODCX\"    Wound Culture:   No results found for: \"WOUNDCULT\"    Ins and Outs:  No intake/output data recorded.          Physical Exam:   BP (!) 179/89   Pulse 81   Temp 98.5 °F (36.9 °C) (Oral)   Resp 20   SpO2 98%   Gen: No acute distress, resting comfortably in bed  HEENT: Eyes clear, moist mucus membranes, hearing intact  Respiratory: No audible wheezing or stridor  Cardiovascular: Well Perfused peripherally, 2+ distal pulse  Abdomen: nondistended, no peritoneal signs  Musculoskeletal: right upper extremity  Skin several abrasions and puncture wounds along the dorsum and radial aspect of the wrist.  Some of these are from previous bite 3 days ago.   TTP about the distal ulna and ulnar aspect of the wrist with swelling along the ulnar aspect of the wrist  No range of motion of the wrist secondary to known fracture.  No pain with passive or active finger flexion extension  SILT m/r/u.   Motor intact ain/pin/m/r/u  Musculature is soft and compressible, no pain with passive stretch        Tertiary: no tenderness over all other joints/long bones as except already stated.    Radiology:   I personally reviewed the films.  X-ray left wrist taken in the emergency room.  This reviewed demonstrates distal ulnar shaft fracture with minimal " displacement.  There is intact hardware in the distal radius.  There is air along the dorsal soft tissues    [unfilled]    Assessment:  68 y.o.male with left distal ulna fracture and dog bite    Plan:   NWB left upper extremity  OR for ORIF left distal ulna and irrigation debridement.  Informed consent obtained  NPO   Unasyn ordered from emergency room  Dispo: Plan for operating room today for above procedure.  May consider discharge from the OR depending upon intraoperative findings versus admission for IV antibiotics    John Breen PA-C

## 2023-12-28 NOTE — OP NOTE
OPERATIVE REPORT  PATIENT NAME: Raji Goldman    :  1955  MRN: [id number]  Pt Location: [facility] OR ROOM 01    SURGERY DATE: 2023    Surgeons and Role:     * Kellie Pireto,  - Primary     * John Breen PA-C - Assisting    Preop Diagnosis:  Other type I or II open fracture of distal end of left ulna, initial encounter [S52.042B]    Post-Op Diagnosis Codes:     * Other type I or II open fracture of distal end of left ulna, initial encounter [S52.993B]    Procedure(s):  Left - OPEN REDUCTION W/ INTERNAL FIXATION (ORIF) RADIUS / ULNA (WRIST)- left distal ulna. irrigation and debridement     Specimen(s):  * No specimens in log *    Estimated Blood Loss:   10 mL    Drains:  * No LDAs found *    Anesthesia Type:   General w/ Regional    Operative Indications:  Other type I or II open fracture of distal end of left ulna, initial encounter [X02.869B]  Dog bite wounds dorsal wrist as well as ulnar side of the wrist.    Operative Findings:  Open take 1 distal ulna fracture with 3 dog bite wounds over the distal ulna.  2 dog bite wounds on the dorsal aspect of the wrist.  Multiple old dog bite wounds around the hand and wrist.    Complications:   None    Procedure and Technique:  The patient was seen in private holding area where the operative extremities marked.  He was taken to the operating room placed in the supine position.  His left upper extremity was prepped and draped in usual sterile fashion.  A timeout was called and the patient draped and administered Unasyn an hour ago in the ED department.  A zigzag incision was made over the ulnar aspect of the distal ulna given the locations of the dog bites and incorporating the dog bites into the wound.  Subcutaneous dissection was taken down with tenotomy scissors.  There is no gross foreign bodies seen.  The superficial ulnar nerves were identified and protected.  The distal ulnar fracture was then visualized.  It was significantly comminuted and there was  bone loss on the ulnar aspect of the distal ulna.  The fracture site was copiously irrigated with 3 L of normal saline.  Under fluoroscopic imaging attempted reduction was performed however the fracture site was significantly unstable due to the bone loss and comminution.  A 1 Vicryl suture was used to provisionally fixate the fracture ends together.  Calcium phosphate chips and demineralized bone matrix putty was used to fill the bone void ulnarly and into the metaphysis of the distal ulna.  Under fluoroscopic imaging a [place] 2.4 mm T plate was placed and used to fixate the fracture site.  Intraoperative fluoroscopy demonstrated near anatomic alignment of the fracture site with adequate length and positioning of the T plate on the distal ulna.  The wound was eric irrigated again and vancomycin powder was placed deep to the wound.  The skin was then closed with 3-0 and 2-0 Monocryl.  3-0 nylon was used to close the epidermis.    Attention was then brought to the to dog bites on the dorsal aspect of the wrist.  An incision was used to connect the 2 dog bites and extended distally and proximally to expose the muscle bellies beneath.  There was a track dameon that went all way down to the bone on both sites.  There is no gross debris seen.  The wound was copiously irrigated and all tissue was clean and viable after 3 L of irrigation.  Vancomycin powder was placed deep to the wound.  The skin was closed with 3-0 Monocryl and 3-0 nylon.  Dressings included Xeroform for 4 gauze Webril and a volar splint.  Ace bandage was placed.  The patient tolerated the procedure well.  There were no complications throughout the case.  The patient was placed in PACU in stable condition.   I was present for the entire procedure., A qualified resident physician was not available., and A physician assistant was required during the procedure for retraction, tissue handling, dissection and suturing.    Patient Disposition:  PACU          SIGNATURE: Kellie Prieto,   DATE: December 28, 2023  TIME: 3:17 PM

## 2023-12-28 NOTE — DISCHARGE INSTR - AVS FIRST PAGE
Kellie Prieto DO    Orthopedic Surgery, Shoulder/Elbow and Sports Medicine  [facility] [address] [address], [place] [address]  Phone: [phone]    General Post-op Surgical Instructions:    Date of Procedure - 12/28/23     Procedure - Left distal ulna ORIF, incision and debridement    Weight Bearing Status - nonweightbearing left arm    DVT Prophylaxis and Duration - not required    PT/OT Instructions - to be discussed at first post-op    Stitches/Staples - Will be removed at 7-10 days postop; we will then place steristrips on incision site    Wound Care - maintain dressing/splint. Keep clean and dry    Xray follow up - to be done at or prior to office visit follow-up if indicated    Additional Info - Please complete your course of antibiotics     Any questions or concerns please call [phone] please!

## 2023-12-29 ENCOUNTER — ANESTHESIA EVENT (INPATIENT)
Dept: PERIOP | Facility: HOSPITAL | Age: 68
End: 2023-12-29
Payer: COMMERCIAL

## 2023-12-29 ENCOUNTER — ANESTHESIA (INPATIENT)
Dept: PERIOP | Facility: HOSPITAL | Age: 68
End: 2023-12-29
Payer: COMMERCIAL

## 2023-12-29 PROBLEM — I10 HTN (HYPERTENSION): Status: ACTIVE | Noted: 2023-12-29

## 2023-12-29 PROCEDURE — 25270 REPAIR FOREARM TENDON/MUSCLE: CPT | Performed by: PHYSICIAN ASSISTANT

## 2023-12-29 PROCEDURE — 99232 SBSQ HOSP IP/OBS MODERATE 35: CPT | Performed by: STUDENT IN AN ORGANIZED HEALTH CARE EDUCATION/TRAINING PROGRAM

## 2023-12-29 PROCEDURE — 99223 1ST HOSP IP/OBS HIGH 75: CPT | Performed by: PHYSICIAN ASSISTANT

## 2023-12-29 PROCEDURE — 25270 REPAIR FOREARM TENDON/MUSCLE: CPT | Performed by: ORTHOPAEDIC SURGERY

## 2023-12-29 PROCEDURE — 12031 INTMD RPR S/A/T/EXT 2.5 CM/<: CPT | Performed by: ORTHOPAEDIC SURGERY

## 2023-12-29 PROCEDURE — 12031 INTMD RPR S/A/T/EXT 2.5 CM/<: CPT | Performed by: PHYSICIAN ASSISTANT

## 2023-12-29 PROCEDURE — NC001 PR NO CHARGE: Performed by: ORTHOPAEDIC SURGERY

## 2023-12-29 RX ORDER — WATER 10 ML/10ML
INJECTION INTRAMUSCULAR; INTRAVENOUS; SUBCUTANEOUS
Status: DISPENSED
Start: 2023-12-29 | End: 2023-12-29

## 2023-12-29 RX ORDER — AMLODIPINE BESYLATE 10 MG/1
10 TABLET ORAL DAILY
Status: DISCONTINUED | OUTPATIENT
Start: 2023-12-29 | End: 2023-12-30 | Stop reason: HOSPADM

## 2023-12-29 RX ORDER — OXYCODONE HYDROCHLORIDE 5 MG/1
5 TABLET ORAL EVERY 4 HOURS PRN
Status: DISCONTINUED | OUTPATIENT
Start: 2023-12-29 | End: 2023-12-30 | Stop reason: HOSPADM

## 2023-12-29 RX ORDER — PROPOFOL 10 MG/ML
INJECTION, EMULSION INTRAVENOUS AS NEEDED
Status: DISCONTINUED | OUTPATIENT
Start: 2023-12-29 | End: 2023-12-29

## 2023-12-29 RX ORDER — AMPICILLIN AND SULBACTAM 2; 1 G/1; G/1
INJECTION, POWDER, FOR SOLUTION INTRAMUSCULAR; INTRAVENOUS
Status: DISPENSED
Start: 2023-12-29 | End: 2023-12-29

## 2023-12-29 RX ORDER — BUPIVACAINE HYDROCHLORIDE AND EPINEPHRINE 5; 5 MG/ML; UG/ML
INJECTION, SOLUTION PERINEURAL AS NEEDED
Status: DISCONTINUED | OUTPATIENT
Start: 2023-12-29 | End: 2023-12-29 | Stop reason: HOSPADM

## 2023-12-29 RX ORDER — SODIUM CHLORIDE, SODIUM LACTATE, POTASSIUM CHLORIDE, CALCIUM CHLORIDE 600; 310; 30; 20 MG/100ML; MG/100ML; MG/100ML; MG/100ML
INJECTION, SOLUTION INTRAVENOUS CONTINUOUS PRN
Status: DISCONTINUED | OUTPATIENT
Start: 2023-12-29 | End: 2023-12-29

## 2023-12-29 RX ORDER — ONDANSETRON 2 MG/ML
INJECTION INTRAMUSCULAR; INTRAVENOUS AS NEEDED
Status: DISCONTINUED | OUTPATIENT
Start: 2023-12-29 | End: 2023-12-29

## 2023-12-29 RX ORDER — MIDAZOLAM HYDROCHLORIDE 2 MG/2ML
INJECTION, SOLUTION INTRAMUSCULAR; INTRAVENOUS AS NEEDED
Status: DISCONTINUED | OUTPATIENT
Start: 2023-12-29 | End: 2023-12-29

## 2023-12-29 RX ORDER — ACETAMINOPHEN 325 MG/1
650 TABLET ORAL EVERY 6 HOURS PRN
Status: DISCONTINUED | OUTPATIENT
Start: 2023-12-29 | End: 2023-12-30 | Stop reason: HOSPADM

## 2023-12-29 RX ORDER — FENTANYL CITRATE 50 UG/ML
INJECTION, SOLUTION INTRAMUSCULAR; INTRAVENOUS AS NEEDED
Status: DISCONTINUED | OUTPATIENT
Start: 2023-12-29 | End: 2023-12-29

## 2023-12-29 RX ORDER — FENTANYL CITRATE/PF 50 MCG/ML
25 SYRINGE (ML) INJECTION
Status: DISCONTINUED | OUTPATIENT
Start: 2023-12-29 | End: 2023-12-29 | Stop reason: HOSPADM

## 2023-12-29 RX ORDER — MAGNESIUM HYDROXIDE 1200 MG/15ML
LIQUID ORAL AS NEEDED
Status: DISCONTINUED | OUTPATIENT
Start: 2023-12-29 | End: 2023-12-29 | Stop reason: HOSPADM

## 2023-12-29 RX ORDER — CEFAZOLIN SODIUM 2 G/50ML
2000 SOLUTION INTRAVENOUS EVERY 8 HOURS
Status: DISCONTINUED | OUTPATIENT
Start: 2023-12-29 | End: 2023-12-29

## 2023-12-29 RX ORDER — HYDROMORPHONE HCL IN WATER/PF 6 MG/30 ML
0.2 PATIENT CONTROLLED ANALGESIA SYRINGE INTRAVENOUS
Status: DISCONTINUED | OUTPATIENT
Start: 2023-12-29 | End: 2023-12-29 | Stop reason: HOSPADM

## 2023-12-29 RX ORDER — EPHEDRINE SULFATE 50 MG/ML
INJECTION INTRAVENOUS AS NEEDED
Status: DISCONTINUED | OUTPATIENT
Start: 2023-12-29 | End: 2023-12-29

## 2023-12-29 RX ORDER — LIDOCAINE HYDROCHLORIDE 10 MG/ML
INJECTION, SOLUTION EPIDURAL; INFILTRATION; INTRACAUDAL; PERINEURAL AS NEEDED
Status: DISCONTINUED | OUTPATIENT
Start: 2023-12-29 | End: 2023-12-29

## 2023-12-29 RX ORDER — VANCOMYCIN HYDROCHLORIDE 1 G/20ML
INJECTION, POWDER, LYOPHILIZED, FOR SOLUTION INTRAVENOUS AS NEEDED
Status: DISCONTINUED | OUTPATIENT
Start: 2023-12-29 | End: 2023-12-29 | Stop reason: HOSPADM

## 2023-12-29 RX ORDER — CEFAZOLIN SODIUM 2 G/50ML
2000 SOLUTION INTRAVENOUS ONCE
Status: COMPLETED | OUTPATIENT
Start: 2023-12-29 | End: 2023-12-29

## 2023-12-29 RX ORDER — CHLORHEXIDINE GLUCONATE ORAL RINSE 1.2 MG/ML
15 SOLUTION DENTAL ONCE
Status: COMPLETED | OUTPATIENT
Start: 2023-12-29 | End: 2023-12-29

## 2023-12-29 RX ORDER — AMOXICILLIN AND CLAVULANATE POTASSIUM 875; 125 MG/1; MG/1
1 TABLET, FILM COATED ORAL EVERY 12 HOURS SCHEDULED
Qty: 14 TABLET | Refills: 0 | Status: SHIPPED | OUTPATIENT
Start: 2023-12-29 | End: 2024-01-05

## 2023-12-29 RX ORDER — DEXAMETHASONE SODIUM PHOSPHATE 10 MG/ML
INJECTION, SOLUTION INTRAMUSCULAR; INTRAVENOUS AS NEEDED
Status: DISCONTINUED | OUTPATIENT
Start: 2023-12-29 | End: 2023-12-29

## 2023-12-29 RX ORDER — MORPHINE SULFATE 4 MG/ML
4 INJECTION, SOLUTION INTRAMUSCULAR; INTRAVENOUS EVERY 4 HOURS PRN
Status: DISCONTINUED | OUTPATIENT
Start: 2023-12-29 | End: 2023-12-29 | Stop reason: SDUPTHER

## 2023-12-29 RX ORDER — OXYCODONE HYDROCHLORIDE 10 MG/1
10 TABLET ORAL EVERY 4 HOURS PRN
Status: DISCONTINUED | OUTPATIENT
Start: 2023-12-29 | End: 2023-12-30 | Stop reason: HOSPADM

## 2023-12-29 RX ORDER — ASPIRIN 81 MG/1
81 TABLET ORAL DAILY
COMMUNITY

## 2023-12-29 RX ADMIN — SODIUM CHLORIDE 3 G: 9 INJECTION, SOLUTION INTRAVENOUS at 14:45

## 2023-12-29 RX ADMIN — SODIUM CHLORIDE 3 G: 9 INJECTION, SOLUTION INTRAVENOUS at 08:45

## 2023-12-29 RX ADMIN — EPHEDRINE SULFATE 10 MG: 50 INJECTION, SOLUTION INTRAVENOUS at 15:11

## 2023-12-29 RX ADMIN — FENTANYL CITRATE 50 MCG: 50 INJECTION, SOLUTION INTRAMUSCULAR; INTRAVENOUS at 15:56

## 2023-12-29 RX ADMIN — ONDANSETRON 4 MG: 2 INJECTION INTRAMUSCULAR; INTRAVENOUS at 14:41

## 2023-12-29 RX ADMIN — PROPOFOL 200 MG: 10 INJECTION, EMULSION INTRAVENOUS at 14:41

## 2023-12-29 RX ADMIN — FENTANYL CITRATE 25 MCG: 50 INJECTION, SOLUTION INTRAMUSCULAR; INTRAVENOUS at 15:01

## 2023-12-29 RX ADMIN — FENTANYL CITRATE 25 MCG: 50 INJECTION, SOLUTION INTRAMUSCULAR; INTRAVENOUS at 14:41

## 2023-12-29 RX ADMIN — OXYCODONE HYDROCHLORIDE 5 MG: 5 TABLET ORAL at 18:45

## 2023-12-29 RX ADMIN — Medication 15 ML: at 14:30

## 2023-12-29 RX ADMIN — SODIUM CHLORIDE 3 G: 9 INJECTION, SOLUTION INTRAVENOUS at 21:08

## 2023-12-29 RX ADMIN — SODIUM CHLORIDE, SODIUM LACTATE, POTASSIUM CHLORIDE, AND CALCIUM CHLORIDE: .6; .31; .03; .02 INJECTION, SOLUTION INTRAVENOUS at 14:36

## 2023-12-29 RX ADMIN — DEXAMETHASONE SODIUM PHOSPHATE 10 MG: 10 INJECTION, SOLUTION INTRAMUSCULAR; INTRAVENOUS at 14:41

## 2023-12-29 RX ADMIN — AMLODIPINE BESYLATE 10 MG: 10 TABLET ORAL at 12:46

## 2023-12-29 RX ADMIN — CEFAZOLIN SODIUM 2000 MG: 2 SOLUTION INTRAVENOUS at 14:41

## 2023-12-29 RX ADMIN — LIDOCAINE HYDROCHLORIDE 50 MG: 10 INJECTION, SOLUTION EPIDURAL; INFILTRATION; INTRACAUDAL at 14:41

## 2023-12-29 RX ADMIN — MIDAZOLAM HYDROCHLORIDE 2 MG: 1 INJECTION, SOLUTION INTRAMUSCULAR; INTRAVENOUS at 14:34

## 2023-12-29 NOTE — ANESTHESIA POSTPROCEDURE EVALUATION
Post-Op Assessment Note    CV Status:  Stable    Pain management: adequate       Mental Status:  Alert and awake   Hydration Status:  Euvolemic   PONV Controlled:  Controlled   Airway Patency:  Patent     Post Op Vitals Reviewed: Yes      Staff: CRNA               BP (!) 196/83 (12/29/23 1614)    Temp 97.5 °F (36.4 °C) (12/29/23 1614)    Pulse 95 (12/29/23 1614)   Resp 15 (12/29/23 1614)    SpO2 99 % (12/29/23 1614)

## 2023-12-29 NOTE — ASSESSMENT & PLAN NOTE
Patient was said to have gotten a tetanus toxoid prior to arrival in the ER as he went to the urgent care on his first presentation  Continue with empiric IV antibiotics for now  Patient reports his Labrador dog is fully vaccinated.  Patient feels the dog is safe and he is seeking a  for its behavior.  I discussed about the safety of other inhabitants of his home and Kumar states he feels they are safe

## 2023-12-29 NOTE — OP NOTE
OPERATIVE REPORT  PATIENT NAME: Raji Goldman    :  1955  MRN: [id number]  Pt Location: [facility] OR ROOM 01    SURGERY DATE: 2023    Surgeons and Role:     * Kellie Prieto,  - Primary     * John Breen PA-C - Assisting    Preop Diagnosis:  Dog bite of arm [S41.159A, W54.0XXA]    Post-Op Diagnosis Codes:     * Dog bite of arm [S41.159A, W54.0XXA]    Procedure(s):  Left - DEBRIDEMENT UPPER EXTREMITY (WASH OUT)- left incision and debridement. wound closure, muscle belly repair    Specimen(s):  * No specimens in log *    Estimated Blood Loss:   Minimal    Drains:  * No LDAs found *    Anesthesia Type:   General    Operative Indications:  Dog bite of arm [S41.159A, W54.0XXA]      Operative Findings:  Large open wound of the proximal dorsum forearm.  Multiple dog bites on the volar forearm.  Laceration of the EDC muscle belly.    Complications:   None    Procedure and Technique:  This is a 68-year-old gentleman who previously underwent an ORIF of the left distal ulna.  He was discharged home however he returned back to the ER after sustaining a large dog bite proximal to his surgical site on the forearm.  There is a large open wound with exposure of muscle belly and neurovascular structures.  He was washed out in the ED and temporarily closed with nylon suture.  However given the extent of his open wound and exposure of the neurovascular structures he was indicated for formal incision and debridement in the operating room.  Prior to surgery the patient did have regain of his motor function of his left upper extremity after obtaining a nerve block for his previous surgery.  He denied any significant numbness or tingling at the time.    The patient was seen in the preoperative holding area where the operative extremity was marked.  He was taken to the operative room and placed in the supine position.  His left upper extremity was prepped and draped in usual sterile fashion.  A timeout was called the patient  was administered Ancef 2 g IV prior to incision.  The dorsal forearm wound was irrigated copiously and was explored.  Exposure of one of the branches of the posterior osseous nerve was identified and showed no have no injury.  The EDC muscle belly was identified and shown to have a large laceration.  The muscle belly was repaired side-to-side with 0 PDS suture in a simple fashion.  The rest of the wound was copiously irrigated and any potential gross contaminants were debrided and removed using a rongeur and curette.  There is no foreign body seen.  The wound was eric irrigated with 3 L of normal saline.  The volar forearm was also identified and showed to have multiple dog bites and these were eric irrigated and debrided with a rongeur and curette.  Closure of the proximal forearm wounds were then performed using 3-0 nylon.  Dressings included Xeroform 4 x 4 gauze Webril and the patient was placed in a long posterior splint.  The patient tolerated procedure well.  There were no complications about the case.  The patient was placed in PACU in stable condition.   I was present for the entire procedure., A qualified resident physician was not available., and A physician assistant was required during the procedure for retraction, tissue handling, dissection and suturing.    Patient Disposition:  PACU         SIGNATURE: Kellie Prieto DO  DATE: December 29, 2023  TIME: 6:47 PM

## 2023-12-29 NOTE — ASSESSMENT & PLAN NOTE
Patient who appears he is on Norvasc 10 mg daily as well as Hyzaar 100/25 mg daily.  Patient appears to indicate he takes one medication  Continue Norvasc 10 mg daily for now

## 2023-12-29 NOTE — ANESTHESIA PREPROCEDURE EVALUATION
Procedure:  DEBRIDEMENT UPPER EXTREMITY (WASH OUT)- left incision and debridement, wound closure and all necessary procedures (Left: Arm Lower)    Infraclavicular block 12/28/23 @ 1236 w/ 266mg Exparel.   - Patient reports returning motor function but decreased sensation remains    Relevant Problems   CARDIO   (+) HTN (hypertension)      Musculoskeletal and Integument   (+) Type I or II open fracture of left ulna      Other   (+) Dog bite        Physical Exam    Airway    Mallampati score: II  TM Distance: >3 FB  Neck ROM: full     Dental        Cardiovascular  Rhythm: regular, Rate: normal, Cardiovascular exam normal    Pulmonary  Pulmonary exam normal Breath sounds clear to auscultation    Other Findings  Damaged/missing tooth as charted      Anesthesia Plan  ASA Score- 2     Anesthesia Type- general with ASA Monitors.         Additional Monitors:     Airway Plan: ETT.    Comment: Risks of general anesthesia discussed including likely possibility of PONV and sore throat, as well as the rare possibilities of aspiration, dental/oropharyngeal/ocular injuries, or grave/life threatening anesthetic and surgical emergencies..       Plan Factors-Exercise tolerance (METS): >4 METS.    Chart reviewed.    Patient summary reviewed.      Patient instructed to abstain from smoking on day of procedure. Patient did not smoke on day of surgery.            Induction- intravenous.    Postoperative Plan- Plan for postoperative opioid use. Planned trial extubation    Informed Consent- Anesthetic plan and risks discussed with patient.  I personally reviewed this patient with the CRNA. Discussed and agreed on the Anesthesia Plan with the CRNA..                
NEGATIVE

## 2023-12-29 NOTE — H&P
[facility]  H and P  Name: Raji Goldman I  MRN: [id number]  Unit/Bed#: ED OVR 22 I Date of Admission: 12/28/2023   Date of Service: 12/29/2023 I Hospital Day: 1    Assessment/Plan   Type I or II open fracture of left ulna  Assessment & Plan  Patient is status post washout and dressing in the ED  Consult orthopedic surgery   Plan for washout in the a.m.  Will keep n.p.o. for now  Continue adequate analgesia for better pain control  Given the extent of the wound we will start him prophylactically on IV antibiotic with Unasyn  Continue to monitor vital signs closely    Dog bite  Assessment & Plan  Patient was said to have gotten a tetanus toxoid prior to arrival in the ER as he went to the urgent care on his first presentation  Continue with empiric IV antibiotics for now  Patient reports his Labrador dog is fully vaccinated      HTN (hypertension)  Assessment & Plan  Patient who appears he is on Norvasc 10 mg daily as well as Hyzaar 100/25 mg daily.  Patient appears to indicate he takes one medication  Continue Norvasc 10 mg daily for now           History of Present Illness     HPI:  Raji Goldman is a 68 y.o. male past medical history of hypertension who presents to the ER following a dog bite.  Apparently patient was seen earlier in the day following an initial dog bite resulting in fracture to his left distal radius and ulna for which he had an ORIF done in the afternoon.  Patient got home and the dog again chomped his injured arm again, and tore up his splint.  Patient stated he did not know the dog was actually biting him directly because his arm was still numb and he thought it was just his jacket.  He later noticed blood everywhere and realize the dog had bitten him again.  He was noted to have a large laceration to his forearm which was not there previously.  Patient states that the dog's been vaccinated.  He used to be a friendly lab dog but unsure of what could have  provoked him.  Patient has earlier attacked one of the visitors  The case was discussed with the orthopedic surgeon who recommended ER to do a washout, approximate the wound with sutures and wet-to-dry bandage and sling which has been applied.  Plan is to have patient go to the OR in the a.m.      Historical Information   Past Medical History:   Diagnosis Date    Hypertension     Stroke (HCC)      Patient Active Problem List   Diagnosis    Type I or II open fracture of left ulna    Open wound of arm    Dog bite    HTN (hypertension)     Past Surgical History:   Procedure Laterality Date    HERNIA REPAIR         Social History   Social History     Substance and Sexual Activity   Alcohol Use Not Currently     Social History     Substance and Sexual Activity   Drug Use Never     Social History     Tobacco Use   Smoking Status Never   Smokeless Tobacco Never       Family History: History reviewed. No pertinent family history.    Meds/Allergies       Current Facility-Administered Medications:     acetaminophen (TYLENOL) tablet 650 mg, 650 mg, Oral, Q6H PRN, Alisha Mcneill MD    aluminum-magnesium hydroxide-simethicone (MAALOX) oral suspension 30 mL, 30 mL, Oral, Q6H PRN, Alisha Mcneill MD    amLODIPine (NORVASC) tablet 10 mg, 10 mg, Oral, Daily, Alisha Mcneill MD    ampicillin-sulbactam (UNASYN) 3 g in sodium chloride 0.9 % 100 mL IVPB, 3 g, Intravenous, Q6H, Alisha Mcneill MD    ampicillin-sulbactam (UNASYN) 3 g injection **ADS Override Pull**, , , ,     morphine injection 4 mg, 4 mg, Intravenous, Q4H PRN, Alisha Mcneill MD    ondansetron (ZOFRAN) injection 4 mg, 4 mg, Intravenous, Q6H PRN, Alisha Mcneill MD    oxyCODONE (ROXICODONE) IR tablet 5 mg, 5 mg, Oral, Q4H PRN, Alisha Mcneill MD    sterile water injection **ADS Override Pull**, , , ,     Current Outpatient Medications:     amoxicillin-clavulanate (AUGMENTIN) 875-125 mg per tablet, Take 1 tablet by mouth every 12 (twelve) hours  for 7 days, Disp: 14 tablet, Rfl: 0    oxyCODONE (Roxicodone) 5 immediate release tablet, Take 1 tablet (5 mg total) by mouth every 4 (four) hours as needed for moderate pain for up to 5 days Max Daily Amount: 30 mg, Disp: 30 tablet, Rfl: 0    No Known Allergies    Review of Systems  A detailed 12 point review of systems was conducted and is negative apart from those mentioned in the HPI.      Objective   Vitals: Blood pressure 141/70, pulse 87, temperature 98.6 °F (37 °C), resp. rate 20, SpO2 95%.    Physical Exam   General-pleasant gentleman, awake and alert, oriented x 3  HEENT: PERRLA, EOMI, sclera anicteric, moist mucous membranes, tongue mucosa without lesions.  Neck: supple, no JVD, lymphadenopathy, thyromegaly.  Heart: Regular rate and rhythm, S1S2 present.  No murmur, rub or gallop.    Lungs; Clear to auscultation bilaterally.  No wheezing, crackles or rhonchi.  No accessory muscle use or respiratory distress.  Abdomen: soft, non-tender, non-distended, NABS.  No guarding or rebound. No peritoneal sound or mass.  Extremities: In the lower extremity there is no clubbing, cyanosis, or edema.  2+ pedal pulses bilaterally.  Full range of motion.  Left upper extremity with sling and dressing in place.  Right hand/wrist dressing with some mild erythema on the fingers  Neurologic:  Cranial nerves II-XII intact.  Strength and sensation globally intact. Speech fluent and goal directed.  Awake, alert and oriented x 3.  Skin: warm and dry. No petechiae, purpura or rash.  Otherwise as above    Lab Results:   Results from last 7 days   Lab Units 12/28/23 2008   WBC Thousand/uL 9.79   HEMOGLOBIN g/dL 12.8   HEMATOCRIT % 38.2   PLATELETS Thousands/uL 186     Results from last 7 days   Lab Units 12/28/23 2008   POTASSIUM mmol/L 3.6   CHLORIDE mmol/L 105   CO2 mmol/L 26   BUN mg/dL 17   CREATININE mg/dL 1.01   CALCIUM mg/dL 9.1         Imaging:  XR forearm 2 views LEFT    (Results Pending)        Code Status: Level 1 -  "Full Code      Counseling / Coordination of Care  Total floor / unit time spent today 70 minutes.  Greater than 50% of total time was spent with the patient and / or family counseling and / or coordination of care.      Portions of the record may have been created with voice recognition software. Occasional wrong word or \"sound a like\" substitutions may have occurred due to the inherent limitations of voice recognition software. Read the chart carefully and recognize, using context, where substitutions have occurred.    "

## 2023-12-29 NOTE — ED PROVIDER NOTES
"History  Chief Complaint   Patient presents with    Dog Bite     Pt just here, left OR after dog bite, reports getting home,\"showing dog what he did\" dog proceeded to rip bandage off, reopening wound.     68-year-old male seen earlier today after dog bite resulting in fracture to his left distal radius and ulna for which he had an ORIF this afternoon.  Patient went home and sustained another dog bite to his arm.  The dog also appears to have partially ripped off his splint as there is no more fiberglass remaining.  Patient also has a large laceration to his forearm which was not there previously.  Patient states that his entire arm is still numb from the surgery so is unable to move it and it does not hurt.        Prior to Admission Medications   Prescriptions Last Dose Informant Patient Reported? Taking?   amoxicillin-clavulanate (AUGMENTIN) 875-125 mg per tablet   No No   Sig: Take 1 tablet by mouth every 12 (twelve) hours for 7 days   oxyCODONE (Roxicodone) 5 immediate release tablet   No No   Sig: Take 1 tablet (5 mg total) by mouth every 4 (four) hours as needed for moderate pain for up to 5 days Max Daily Amount: 30 mg      Facility-Administered Medications: None       Past Medical History:   Diagnosis Date    Hypertension     Stroke (HCC)        Past Surgical History:   Procedure Laterality Date    HERNIA REPAIR         History reviewed. No pertinent family history.  I have reviewed and agree with the history as documented.    E-Cigarette/Vaping    E-Cigarette Use Never User      E-Cigarette/Vaping Substances     Social History     Tobacco Use    Smoking status: Never    Smokeless tobacco: Never   Vaping Use    Vaping status: Never Used   Substance Use Topics    Alcohol use: Not Currently    Drug use: Never       Review of Systems   All other systems reviewed and are negative.      Physical Exam  Physical Exam  Constitutional:       General: He is not in acute distress.  Cardiovascular:      Rate and Rhythm: " Normal rate.   Pulmonary:      Effort: Pulmonary effort is normal.   Musculoskeletal:      Comments: Approx 5 cm laceration to dorsal L forearm involving underlying muscle, unable to assess strength and sensation due to residual nerve block, fingers warm and well perfused   Neurological:      Mental Status: He is alert.         Vital Signs  ED Triage Vitals   Temp Pulse Respirations Blood Pressure SpO2   -- 12/28/23 1808 12/28/23 1808 12/28/23 1808 12/28/23 1809    98 18 133/98 98 %      Temp src Heart Rate Source Patient Position - Orthostatic VS BP Location FiO2 (%)   -- 12/28/23 1808 -- 12/28/23 1808 --    Monitor  Right arm       Pain Score       --                  Vitals:    12/28/23 1808 12/28/23 1818 12/28/23 1845   BP: 133/98 125/89 149/70   Pulse: 98 97 83         Visual Acuity  Visual Acuity      Flowsheet Row Most Recent Value   L Pupil Size (mm) 3   R Pupil Size (mm) 3            ED Medications  Medications - No data to display    Diagnostic Studies  Results Reviewed       Procedure Component Value Units Date/Time    CBC and differential [[id number]]  (Abnormal) Collected: 12/28/23 2008    Lab Status: Final result Specimen: Blood from Arm, Right Updated: 12/28/23 2030     WBC 9.79 Thousand/uL      RBC 4.26 Million/uL      Hemoglobin 12.8 g/dL      Hematocrit 38.2 %      MCV 90 fL      MCH 30.0 pg      MCHC 33.5 g/dL      RDW 13.8 %      MPV 8.7 fL      Platelets 186 Thousands/uL      nRBC 0 /100 WBCs      Neutrophils Relative 90 %      Immat GRANS % 0 %      Lymphocytes Relative 6 %      Monocytes Relative 4 %      Eosinophils Relative 0 %      Basophils Relative 0 %      Neutrophils Absolute 8.80 Thousands/µL      Immature Grans Absolute 0.02 Thousand/uL      Lymphocytes Absolute 0.57 Thousands/µL      Monocytes Absolute 0.38 Thousand/µL      Eosinophils Absolute 0.01 Thousand/µL      Basophils Absolute 0.01 Thousands/µL     Narrative:      This is an appended report.  These results have been appended  to a previously verified report.    Basic metabolic panel [[id number]]  (Abnormal) Collected: 12/28/23 2008    Lab Status: Final result Specimen: Blood from Arm, Right Updated: 12/28/23 2027     Sodium 140 mmol/L      Potassium 3.6 mmol/L      Chloride 105 mmol/L      CO2 26 mmol/L      ANION GAP 9 mmol/L      BUN 17 mg/dL      Creatinine 1.01 mg/dL      Glucose 155 mg/dL      Calcium 9.1 mg/dL      eGFR 77 ml/min/1.73sq m     Narrative:      National Kid[employer] Disease Foundation guidelines for Chronic Kidney Disease (CKD):     Stage 1 with normal or high GFR (GFR > 90 mL/min/1.73 square meters)    Stage 2 Mild CKD (GFR = 60-89 mL/min/1.73 square meters)    Stage 3A Moderate CKD (GFR = 45-59 mL/min/1.73 square meters)    Stage 3B Moderate CKD (GFR = 30-44 mL/min/1.73 square meters)    Stage 4 Severe CKD (GFR = 15-29 mL/min/1.73 square meters)    Stage 5 End Stage CKD (GFR <15 mL/min/1.73 square meters)  Note: GFR calculation is accurate only with a steady state creatinine                   XR forearm 2 views LEFT    (Results Pending)              Procedures  Universal Protocol:  Consent: Verbal consent obtained.  Consent given by: patient  Laceration repair    Date/Time: 12/28/2023 8:49 PM    Performed by: Sangeetha Beyer MD  Authorized by: Sangeetha Beyer MD  Body area: upper extremity  Location details: left lower arm  Laceration length: 5 cm  Foreign bodies: no foreign bodies  Tendon involvement: complex  Nerve involvement: unable to evaluate.  Vascular damage: no    Sedation:  Patient sedated: no        Procedure Details:  Irrigation solution: saline  Amount of cleaning: extensive  Debridement: none  Skin closure: 3-0 nylon  Technique: simple  Approximation: loose  Approximation difficulty: complex  Dressing: wet to dry kerlix, ace wrap, sling.               ED Course       68-year-old male presenting for the second time today with a new dog bite to left forearm with guarding large gaping laceration  involving muscle.  Oozing blood but no arterial bleeding.  Unable to assess strength or sensation due to persistent nerve block of the entire left arm from earlier surgery. Underlying postop dressing still intact. XR shows no new fracture or disruption of ORIF per my independent interpretation.  I spoke with Dr. Prieto of Ortho who recommended that I do a bedside washout and loosely approximated the wound with sutures and bandaged with wet-to-dry dressings.  Patient will then be admitted and Ortho will take him to the OR for a washout tomorrow.                        SBIRT 20yo+      Flowsheet Row Most Recent Value   Initial Alcohol Screen: US AUDIT-C     1. How often do you have a drink containing alcohol? 0 Filed at: 12/28/2023 1808   2. How many drinks containing alcohol do you have on a typical day you are drinking?  0 Filed at: 12/28/2023 1808   3a. Male UNDER 65: How often do you have five or more drinks on one occasion? 0 Filed at: 12/28/2023 1808   3b. FEMALE Any Age, or MALE 65+: How often do you have 4 or more drinks on one occassion? 0 Filed at: 12/28/2023 1808   Audit-C Score 0 Filed at: 12/28/2023 1808   [place]: How many times in the past year have you...    Used an illegal drug or used a prescription medication for non-medical reasons? Never Filed at: 12/28/2023 1808                      Medical Decision Making  Amount and/or Complexity of Data Reviewed  Labs: ordered.  Radiology: ordered.    Risk  Decision regarding hospitalization.             Disposition  Final diagnoses:   Dog bite of arm     Time reflects when diagnosis was documented in both MDM as applicable and the Disposition within this note       Time User Action Codes Description Comment    12/28/2023  8:38 PM Sangeetha Beyer Add [S41.159A,  W54.0XXA] Dog bite of arm           ED Disposition       ED Disposition   Admit    Condition   Stable    Date/Time   Thu Dec 28, 2023 2038    Comment   Case was discussed with Charito and the patient's  admission status was agreed to be Admission Status: inpatient status to the service of Dr. Mcneill .               Follow-up Information    None         Patient's Medications   Discharge Prescriptions    No medications on file       No discharge procedures on file.    PDMP Review         Value Time User    PDMP Reviewed  Yes 12/28/2023  3:46 PM John Breen PA-C            ED Provider  Electronically Signed by             Sangeetha Beyer MD  12/28/23 2051

## 2023-12-29 NOTE — ASSESSMENT & PLAN NOTE
Patient was said to have gotten a tetanus toxoid prior to arrival in the ER as he went to the urgent care on his first presentation  Continue with empiric IV antibiotics for now  Patient reports his Labrador dog is fully vaccinated

## 2023-12-29 NOTE — PROGRESS NOTES
[facility]  Progress Note  Name: Raji Goldman I  MRN: [id number]  Unit/Bed#: OR POOL I Date of Admission: 12/28/2023   Date of Service: 12/29/2023 I Hospital Day: 1    Assessment/Plan   HTN (hypertension)  Assessment & Plan  Patient who appears he is on Norvasc 10 mg daily as well as Hyzaar 100/25 mg daily.  Patient appears to indicate he takes one medication  Continue Norvasc 10 mg daily for now    Dog bite  Assessment & Plan  Patient was said to have gotten a tetanus toxoid prior to arrival in the ER as he went to the urgent care on his first presentation  Continue with empiric IV antibiotics for now  Patient reports his Labrador dog is fully vaccinated.  Patient feels the dog is safe and he is seeking a  for its behavior.  I discussed about the safety of other inhabitants of his home and Kumar states he feels they are safe      Type I or II open fracture of left ulna  Assessment & Plan  Patient is status post washout and dressing in the ED  Consult orthopedic surgery   Plan for washout in the a.m.  Will keep n.p.o. for now  Continue adequate analgesia for better pain control  Given the extent of the wound we will start him prophylactically on IV antibiotic with Unasyn  Continue to monitor vital signs closely             VTE Pharmacologic Prophylaxis:   Moderate Risk (Score 3-4) - Pharmacological DVT Prophylaxis Contraindicated. Sequential Compression Devices Ordered.    Mobility:      HLM Goal NOT achieved. Continue with multidisciplinary rounding and encourage appropriate mobility to improve upon HLM goals.    Patient Centered Rounds: I performed bedside rounds with nursing staff today.   Discussions with Specialists or Other Care Team Provider: Orthopedics    Education and Discussions with Family / Patient: Patient declined call to .     Total Time Spent on Date of Encounter in care of patient: 45 mins. This time was spent on one or more of the following:  performing physical exam; counseling and coordination of care; obtaining or reviewing history; documenting in the medical record; reviewing/ordering tests, medications or procedures; communicating with other healthcare professionals and discussing with patient's family/caregivers.    Current Length of Stay: 1 day(s)  Current Patient Status: Inpatient   Certification Statement: The patient will continue to require additional inpatient hospital stay due to Dog bite  Discharge Plan: Anticipate discharge tomorrow to home.    Code Status: Level 1 - Full Code    Subjective:   Seen and examined at bedside, Kumar states he is resting comfortably and has no new complaints.  He states that his dog is acting normally at home and does not pose a threat to anyone else.  He does not think the dog needs to be given away or put down. And he is going to seek a canine behavior specialist for his dog.  He feels inhabitants at home are safe with his dog around    Objective:     Vitals:   Temp (24hrs), Av.9 °F (36.6 °C), Min:97.5 °F (36.4 °C), Max:98.6 °F (37 °C)    Temp:  [97.5 °F (36.4 °C)-98.6 °F (37 °C)] 97.6 °F (36.4 °C)  HR:  [65-98] 73  Resp:  [12-20] 18  BP: (114-190)/(55-98) 190/77  SpO2:  [95 %-99 %] 97 %  There is no height or weight on file to calculate BMI.     Input and Output Summary (last 24 hours):     Intake/Output Summary (Last 24 hours) at 2023 1510  Last data filed at 2023 1445  Gross per 24 hour   Intake 1320 ml   Output 10 ml   Net 1310 ml       Physical Exam:   Physical Exam  Vitals and nursing note reviewed.   Constitutional:       General: He is not in acute distress.     Appearance: He is well-developed. He is not toxic-appearing or diaphoretic.   HENT:      Head: Normocephalic and atraumatic.      Mouth/Throat:      Mouth: Mucous membranes are moist.   Eyes:      General: No scleral icterus.     Extraocular Movements: Extraocular movements intact.      Conjunctiva/sclera: Conjunctivae normal.    Cardiovascular:      Rate and Rhythm: Normal rate and regular rhythm.      Pulses: Normal pulses.      Heart sounds: No murmur heard.     No friction rub. No gallop.   Pulmonary:      Effort: Pulmonary effort is normal. No respiratory distress.      Breath sounds: Normal breath sounds. No wheezing, rhonchi or rales.   Abdominal:      General: Abdomen is flat. Bowel sounds are normal. There is no distension.      Palpations: Abdomen is soft. There is no mass.      Tenderness: There is no abdominal tenderness. There is no guarding.   Musculoskeletal:         General: No swelling.      Cervical back: Neck supple.      Right lower leg: No edema.      Left lower leg: No edema.   Lymphadenopathy:      Cervical: No cervical adenopathy.   Skin:     General: Skin is warm and dry.      Capillary Refill: Capillary refill takes less than 2 seconds.      Coloration: Skin is not jaundiced.      Findings: No rash.      Comments: Wounds of BL forearms, both are wrapped. L wrapping has some dried blood around it   Neurological:      General: No focal deficit present.      Mental Status: He is alert and oriented to person, place, and time.      Sensory: No sensory deficit.      Motor: No weakness.   Psychiatric:         Mood and Affect: Mood normal.          Additional Data:     Labs:  Results from last 7 days   Lab Units 12/28/23 2008   WBC Thousand/uL 9.79   HEMOGLOBIN g/dL 12.8   HEMATOCRIT % 38.2   PLATELETS Thousands/uL 186   NEUTROS PCT % 90*   LYMPHS PCT % 6*   MONOS PCT % 4   EOS PCT % 0     Results from last 7 days   Lab Units 12/28/23 2008   SODIUM mmol/L 140   POTASSIUM mmol/L 3.6   CHLORIDE mmol/L 105   CO2 mmol/L 26   BUN mg/dL 17   CREATININE mg/dL 1.01   ANION GAP mmol/L 9   CALCIUM mg/dL 9.1   GLUCOSE RANDOM mg/dL 155*                       Lines/Drains:  Invasive Devices       Peripheral Intravenous Line  Duration             Peripheral IV 12/28/23 Right Antecubital <1 day              Epidural Line  Duration              Nerve Block Catheter 12/28/23 1 day              Airway  Duration             Supraglottic Airway LMA 4 <1 day                    Imaging: Reviewed radiology reports from this admission including: xray(s)    Recent Cultures (last 7 days):         Last 24 Hours Medication List:   Current Facility-Administered Medications   Medication Dose Route Frequency Provider Last Rate    [Transfer Hold] acetaminophen  650 mg Oral Q6H PRN Alisha Mcneill MD      [Transfer Hold] aluminum-magnesium hydroxide-simethicone  30 mL Oral Q6H PRN Alisha Mcneill MD      [Transfer Hold] amLODIPine  10 mg Oral Daily Alisha Mcneill MD      [Transfer Hold] ampicillin-sulbactam  3 g Intravenous Q6H Alisha Mcneill MD 0 g (12/29/23 0915)    [Transfer Hold] morphine injection  4 mg Intravenous Q4H PRN Alisha Mcneill MD      [Transfer Hold] ondansetron  4 mg Intravenous Q6H PRN Alisha Mcneill MD      oxyCODONE  5 mg Oral Q4H PRN Alisha Mcneill MD      sodium chloride    PRN Hithem Rahmi, DO      sodium chloride    PRN Hithem Rahmi, DO       Facility-Administered Medications Ordered in Other Encounters   Medication Dose Route Frequency Provider Last Rate    dexamethasone (PF)   Intravenous PRN Estella Fountain CRNA      fentanyl citrate (PF)   Intravenous PRN Estellarakesh Fountain, HAZEL      lactated ringers   Intravenous Continuous PRN Estellarakesh Fountain, HAZEL      lidocaine (PF)   Intravenous PRN Estella Fountain, HAZEL      midazolam   Intravenous PRN Estellarakesh Fountain, HAZEL      ondansetron   Intravenous PRN Estella Fountain, HAZEL      propofol   Intravenous PRN Estellarakesh Fountain CRNA          Today, Patient Was Seen By: Tahir Kincaid    **Please Note: This note may have been constructed using a voice recognition system.**

## 2023-12-29 NOTE — UTILIZATION REVIEW
Initial Clinical Review    Patient presented to ED with left wrist injury. past medical history of hypertension who presents to the ER following a dog bite.  Apparently patient was seen earlier in the day following an initial dog bite resulting in fracture to his left distal radius and ulna for which he had an ORIF done in the afternoon.  Patient got home and the dog again chomped his injured arm again, and tore up his splint.  Patient stated he did not know the dog was actually biting him directly because his arm was still numb and he thought it was just his jacket.  He later noticed blood everywhere and realize the dog had bitten him again.  He was noted to have a large laceration to his forearm which was not there previously.  Patient states that the dog's been vaccinated.  He used to be a friendly lab dog but unsure of what could have provoked him.  Patient has earlier attacked one of the visitors Orthopedics consulted recommended to do a washout, approximate the wound with sutures and wet-to-dry bandage and sling which has been applied.       Elective  inpatient surgical procedure  Age/Sex: 68 y.o. male  Surgery Date: 12-28-23  Procedure:   Procedure(s):  Left - OPEN REDUCTION W/ INTERNAL FIXATION (ORIF) RADIUS / ULNA (WRIST)- left distal ulna. irrigation and debridement   Anesthesia: general  Operative Findings:   Open take 1 distal ulna fracture with 3 dog bite wounds over the distal ulna.  2 dog bite wounds on the dorsal aspect of the wrist.  Multiple old dog bite wounds around the hand and wrist.       POD#1 Progress Note: Patient remains NPO Large crescent shaped wound over the proximal forearm lateral aspect with some tissue loss and exposed muscle  back to OR today for washout and wound closure of left forearm.      Admission Orders: Date/Time/Statement:   Admission Orders (From admission, onward)       Ordered        12/28/23 2039  INPATIENT ADMISSION  Once                          Orders Placed This  Encounter   Procedures    INPATIENT ADMISSION     Standing Status:   Standing     Number of Occurrences:   1     Order Specific Question:   Level of Care     Answer:   Med Surg [16]     Order Specific Question:   Estimated length of stay     Answer:   More than 2 Midnights     Order Specific Question:   Certification     Answer:   I certify that inpatient services are medically necessary for this patient for a duration of greater than two midnights. See H&P and MD Progress Notes for additional information about the patient's course of treatment.     Vital Signs: /73 (BP Location: Right arm)   Pulse 65   Temp 98.6 °F (37 °C)   Resp 18   SpO2 98%     Pertinent Labs/Diagnostic Test Results:   XR forearm 2 views LEFT   Final Result by Manuel Sims DO (12/29 0852)      Status post ORIF of the distal radius and ulna.      Extensive subcutaneous emphysema extending proximally along the forearm to the level of the elbow, increased from prior.       Left wrist 12-28-23  Comminuted distal ulnar fracture with volar displacement.   Soft tissue swelling and subcutaneous emphysema overlying the distal forearm and wrist.       Results from last 7 days   Lab Units 12/28/23 2008   WBC Thousand/uL 9.79   HEMOGLOBIN g/dL 12.8   HEMATOCRIT % 38.2   PLATELETS Thousands/uL 186   NEUTROS ABS Thousands/µL 8.80*         Results from last 7 days   Lab Units 12/28/23 2008   SODIUM mmol/L 140   POTASSIUM mmol/L 3.6   CHLORIDE mmol/L 105   CO2 mmol/L 26   ANION GAP mmol/L 9   BUN mg/dL 17   CREATININE mg/dL 1.01   EGFR ml/min/1.73sq m 77   CALCIUM mg/dL 9.1             Results from last 7 days   Lab Units 12/28/23 2008   GLUCOSE RANDOM mg/dL 155*       Diet: as tolerate  Mobility: ambulate  DVT Prophylaxis: SCD    Medications/Pain Control:   Scheduled Medications:  amLODIPine, 10 mg, Oral, Daily  ampicillin-sulbactam, 3 g, Intravenous, Q6H  ampicillin-sulbactam, , ,   sterile water, , ,       Continuous IV Infusions:     PRN  Meds:  acetaminophen, 650 mg, Oral, Q6H PRN  aluminum-magnesium hydroxide-simethicone, 30 mL, Oral, Q6H PRN  ampicillin-sulbactam, , ,   morphine injection, 4 mg, Intravenous, Q4H PRN  ondansetron, 4 mg, Intravenous, Q6H PRN  oxyCODONE, 5 mg, Oral, Q4H PRN  sterile water, , ,         Network Utilization Review Department  ATTENTION: Please call with any questions or concerns to [phone] and carefully listen to the prompts so that you are directed to the right person. All voicemails are confidential.   For Discharge needs, contact Care Management DC Support Team at [phone] opt. 2  Send all requests for admission clinical reviews, approved or denied determinations and any other requests to dedicated fax number below belonging to the [facility] where the patient is receiving treatment. List of dedicated fax numbers for the Facilities:  FACILITY NAME UR FAX NUMBER   ADMISSION DENIALS (Administrative/Medical Necessity) [phone]   DISCHARGE SUPPORT TEAM (NETWORK) [phone]   PARENT CHILD HEALTH (Maternity/NICU/Pediatrics) [phone]   [facility] [phone]   [facility] [phone]   [facility] [phone]   [facility] [phone]   [facility] [phone]   [facility] [phone]   [facility] [phone]   [facility] [phone]   [facility] [phone]   [facility] [phone]   [facility] [phone]

## 2023-12-29 NOTE — H&P
Orthopedics   Raji Goldman 68 y.o. male MRN: [id number]  Unit/Bed#: [facility] 13      Chief Complaint:   Left forearm dog bite    HPI:  68 y.o. male presents for dog bite to the left arm.  Yesterday he had an ORIF of his left distal ulna secondary to fracture from a dog bite.  He went home while still numb from the block and the dog bit his arm again.  He was brought to the ER for this wound.  The splint was not on when he returned to the ER.  His block is still in effect from yesterday so he has no pain.    Review Of Systems:   Skin: Large wound left forearm along with previous dog bite  Neuro: See HPI  Musculoskeletal: See HPI  14 point review of systems negative except as stated above     Past Medical History:   Past Medical History:   Diagnosis Date    Hypertension     Stroke (HCC)        Past Surgical History:   Past Surgical History:   Procedure Laterality Date    HERNIA REPAIR      ORIF WRIST FRACTURE Left 12/28/2023    Procedure: OPEN REDUCTION W/ INTERNAL FIXATION (ORIF) RADIUS / ULNA (WRIST)- left distal ulna, irrigation and debridement and all necessar procedures;  Surgeon: Kellie Prieto DO;  Location: [facility] OR;  Service: Orthopedics       Family History:  Family history reviewed and non-contributory  History reviewed. No pertinent family history.    Social History:  Social History     Socioeconomic History    Marital status: Single     Spouse name: None    Number of children: None    Years of education: None    Highest education level: None   Occupational History    None   Tobacco Use    Smoking status: Never    Smokeless tobacco: Never   Vaping Use    Vaping status: Never Used   Substance and Sexual Activity    Alcohol use: Not Currently    Drug use: Never    Sexual activity: None   Other Topics Concern    None   Social History Narrative    None     Social Determinants of Health     Financial Resource Strain: Not on file   Food Insecurity: Not on file   Transportation Needs: Not on file   Physical  "Activity: Not on file   Stress: Not on file   Social Connections: Not on file   Intimate Partner Violence: Not on file   Housing Stability: Not on file       Allergies:   No Known Allergies        Labs:  0   Lab Value Date/Time    HCT 38.2 12/28/2023 2008    HGB 12.8 12/28/2023 2008    WBC 9.79 12/28/2023 2008       Meds:    Current Facility-Administered Medications:     [Transfer Hold] acetaminophen (TYLENOL) tablet 650 mg, 650 mg, Oral, Q6H PRN, Alisha Mcneill MD    [Transfer Hold] aluminum-magnesium hydroxide-simethicone (MAALOX) oral suspension 30 mL, 30 mL, Oral, Q6H PRN, Alisha Mcneill MD    [Transfer Hold] amLODIPine (NORVASC) tablet 10 mg, 10 mg, Oral, Daily, Alisha Mcneill MD, 10 mg at 12/29/23 1246    [Transfer Hold] ampicillin-sulbactam (UNASYN) 3 g in sodium chloride 0.9 % 100 mL IVPB, 3 g, Intravenous, Q6H, Alisha Mcneill MD, Stopped at 12/29/23 0915    ampicillin-sulbactam (UNASYN) 3 g injection **ADS Override Pull**, , , ,     ceFAZolin (ANCEF) IVPB (premix in dextrose) 2,000 mg 50 mL, 2,000 mg, Intravenous, Once, John Breen PA-C    chlorhexidine (PERIDEX) 0.12 % oral rinse 15 mL, 15 mL, Swish & Spit, Once, John Breen PA-C    [Transfer Hold] morphine injection 4 mg, 4 mg, Intravenous, Q4H PRN, Alisha Mcneill MD    [Transfer Hold] ondansetron (ZOFRAN) injection 4 mg, 4 mg, Intravenous, Q6H PRN, Alisha Mcneill MD    oxyCODONE (ROXICODONE) IR tablet 5 mg, 5 mg, Oral, Q4H PRN, Alisha Mcneill MD    sterile water injection **ADS Override Pull**, , , ,     Blood Culture:   No results found for: \"BLOODCX\"    Wound Culture:   No results found for: \"WOUNDCULT\"    Ins and Outs:  I/O last 24 hours:  In: 1220 [P.O.:120; I.V.:1000; IV Piggyback:100]  Out: 10 [Blood:10]          Physical Exam:   BP (!) 190/77   Pulse 73   Temp 97.6 °F (36.4 °C) (Temporal)   Resp 18   SpO2 97%   Gen: No acute distress, resting comfortably in bed  HEENT: Eyes clear, moist mucus membranes, " hearing intact  Respiratory: No audible wheezing or stridor  Cardiovascular: Well Perfused peripherally, 2+ distal pulse  Abdomen: nondistended, no peritoneal signs  Musculoskeletal: left upper extremity  Left forearm in bandage however the splint is not present  Large crescent shaped wound over the proximal forearm lateral aspect with some tissue loss and exposed muscle  Sensation not able to be assessed secondary to block  Motor not able to assess secondary block  Fingers warm and well-perfused        Tertiary: no tenderness over all other joints/long bones as except already stated.    Radiology:   I personally reviewed the films.  X-ray of the left forearm was taken in the ER this demonstrates intact hardware in the distal ulna without displacement.  There is a healed distal radius fracture with hardware in place.    [unfilled]    Assessment:  68 y.o.male with left forearm dog wound and previous left ulna ORIF on 1220    Plan:   NWB left upper extremity  OR for washout and wound closure of left forearm.  Informed consent obtained  NPO   Antibiotics on hold for the OR  Patient will likely require short admission for completion of IV antibiotics    John Breen PA-C

## 2023-12-29 NOTE — DISCHARGE INSTR - AVS FIRST PAGE
Kellie Prieto DO    Orthopedic Surgery, Shoulder/Elbow and Sports Medicine  [address], [place] [address]  Phone: [phone]    General Post-op Surgical Instructions:    Date of Procedure - 12/29/23     Procedure - Left forearm wound washout and closure    Weight Bearing Status - nonweightbearing left arm in sling    DVT Prophylaxis and Duration - not required    PT/OT Instructions - to be discussed at first post-op    Stitches/Staples - Will be removed at 7-10 days postop; we will then place steristrips on incision site    Wound Care - maintain splint, do not remove, keep clean and dry, do not get wet.     Xray follow up - to be done at or prior to office visit follow-up if indicated    Additional Info - Please complete your course of antibiotics     Any questions or concerns please call [phone] please!

## 2023-12-29 NOTE — ASSESSMENT & PLAN NOTE
Patient is status post washout and dressing in the ED  Consult orthopedic surgery   Plan for washout in the a.m.  Will keep n.p.o. for now  Continue adequate analgesia for better pain control  Given the extent of the wound we will start him prophylactically on IV antibiotic with Unasyn  Continue to monitor vital signs closely

## 2023-12-30 VITALS
WEIGHT: 194 LBS | TEMPERATURE: 98.4 F | HEART RATE: 80 BPM | SYSTOLIC BLOOD PRESSURE: 175 MMHG | BODY MASS INDEX: 27.16 KG/M2 | OXYGEN SATURATION: 96 % | RESPIRATION RATE: 18 BRPM | DIASTOLIC BLOOD PRESSURE: 79 MMHG | HEIGHT: 71 IN

## 2023-12-30 LAB
ANION GAP SERPL CALCULATED.3IONS-SCNC: 8 MMOL/L
BUN SERPL-MCNC: 19 MG/DL (ref 5–25)
CALCIUM SERPL-MCNC: 8.4 MG/DL (ref 8.4–10.2)
CHLORIDE SERPL-SCNC: 106 MMOL/L (ref 96–108)
CO2 SERPL-SCNC: 27 MMOL/L (ref 21–32)
CREAT SERPL-MCNC: 1.05 MG/DL (ref 0.6–1.3)
ERYTHROCYTE [DISTWIDTH] IN BLOOD BY AUTOMATED COUNT: 14 % (ref 11.6–15.1)
GFR SERPL CREATININE-BSD FRML MDRD: 73 ML/MIN/1.73SQ M
GLUCOSE SERPL-MCNC: 127 MG/DL (ref 65–140)
HCT VFR BLD AUTO: 33.7 % (ref 36.5–49.3)
HGB BLD-MCNC: 11 G/DL (ref 12–17)
MAGNESIUM SERPL-MCNC: 2.1 MG/DL (ref 1.9–2.7)
MCH RBC QN AUTO: 29.9 PG (ref 26.8–34.3)
MCHC RBC AUTO-ENTMCNC: 32.6 G/DL (ref 31.4–37.4)
MCV RBC AUTO: 92 FL (ref 82–98)
PLATELET # BLD AUTO: 192 THOUSANDS/UL (ref 149–390)
PMV BLD AUTO: 9.6 FL (ref 8.9–12.7)
POTASSIUM SERPL-SCNC: 4 MMOL/L (ref 3.5–5.3)
RBC # BLD AUTO: 3.68 MILLION/UL (ref 3.88–5.62)
SODIUM SERPL-SCNC: 141 MMOL/L (ref 135–147)
WBC # BLD AUTO: 8.48 THOUSAND/UL (ref 4.31–10.16)

## 2023-12-30 PROCEDURE — 85027 COMPLETE CBC AUTOMATED: CPT | Performed by: STUDENT IN AN ORGANIZED HEALTH CARE EDUCATION/TRAINING PROGRAM

## 2023-12-30 PROCEDURE — 80048 BASIC METABOLIC PNL TOTAL CA: CPT | Performed by: STUDENT IN AN ORGANIZED HEALTH CARE EDUCATION/TRAINING PROGRAM

## 2023-12-30 PROCEDURE — NC001 PR NO CHARGE: Performed by: ORTHOPAEDIC SURGERY

## 2023-12-30 PROCEDURE — 83735 ASSAY OF MAGNESIUM: CPT | Performed by: STUDENT IN AN ORGANIZED HEALTH CARE EDUCATION/TRAINING PROGRAM

## 2023-12-30 PROCEDURE — 99024 POSTOP FOLLOW-UP VISIT: CPT | Performed by: PHYSICIAN ASSISTANT

## 2023-12-30 RX ADMIN — AMLODIPINE BESYLATE 10 MG: 10 TABLET ORAL at 09:18

## 2023-12-30 RX ADMIN — SODIUM CHLORIDE 3 G: 9 INJECTION, SOLUTION INTRAVENOUS at 03:35

## 2023-12-30 NOTE — PLAN OF CARE

## 2023-12-30 NOTE — PROGRESS NOTES
"Progress Note - Orthopedics   Raji Goldman 68 y.o. male MRN: [id number]  Unit/Bed#: -01      Subjective:    68 y.o.male s/p left distal ulnar ORIF on 12/28 and left dog bite wash out and closure proximal forearm 12/29 both by Dr. Prieto No acute events, no new complaints. Pain well controlled. Denies fevers, chills, CP, SOB, N/V, numbness or tingling. Patient reports no issues with urination or bowel movements. Patient states he has no numbness or tingling in the hand, the block from 12/28 has worn off.     Labs:  0   Lab Value Date/Time    HCT 33.7 (L) 12/30/2023 0439    HCT 38.2 12/28/2023 2008    HGB 11.0 (L) 12/30/2023 0439    HGB 12.8 12/28/2023 2008    WBC 8.48 12/30/2023 0439    WBC 9.79 12/28/2023 2008       Meds:    Current Facility-Administered Medications:     acetaminophen (TYLENOL) tablet 650 mg, 650 mg, Oral, Q6H PRN, John Breen PA-C    [Transfer Hold] aluminum-magnesium hydroxide-simethicone (MAALOX) oral suspension 30 mL, 30 mL, Oral, Q6H PRN, Alisha Mcneill MD    amLODIPine (NORVASC) tablet 10 mg, 10 mg, Oral, Daily, John Breen PA-C, 10 mg at 12/29/23 1246    morphine injection 2 mg, 2 mg, Intravenous, Q2H PRN, John Breen PA-C    [Transfer Hold] ondansetron (ZOFRAN) injection 4 mg, 4 mg, Intravenous, Q6H PRN, Alisha Mcneill MD    oxyCODONE (ROXICODONE) immediate release tablet 10 mg, 10 mg, Oral, Q4H PRN, John Breen PA-C    oxyCODONE (ROXICODONE) IR tablet 5 mg, 5 mg, Oral, Q4H PRN, Alisha Mcneill MD    oxyCODONE (ROXICODONE) IR tablet 5 mg, 5 mg, Oral, Q4H PRN, John Breen PA-C, 5 mg at 12/29/23 1845    pneumococcal 20-dayami conj vacc (PREVNAR 20) IM Injection 0.5 mL, 0.5 mL, Intramuscular, Once, Kellie Prieto,     Blood Culture:   No results found for: \"BLOODCX\"    Wound Culture:   No results found for: \"WOUNDCULT\"    Ins and Outs:  I/O last 24 hours:  In: 1520 [P.O.:720; I.V.:600; IV Piggyback:200]  Out: 450 [Urine:450]          Physical:  Vitals:    " 12/30/23 0338   BP: 130/62   Pulse: 71   Resp: 18   Temp: 98.7 °F (37.1 °C)   SpO2: 96%     Musculoskeletal: left Upper Extremity  Long arm splint is intact, clean and dry  Sensation intact to median/radial/ulnar nerve distribution   Motor intact  nerve/median/radial/ulnar nerve distributions. Able to cross fingers, make OK sign/pinch, interossei intact  Digits warm and well perfused  Capillary refill < 2 seconds        Assessment:    68 y.o.male  s/p left distal ulnar ORIF on 12/28 and left dog bite wash out and closure proximal forearm 12/29 both by Dr. Prieto     Plan:  GENNY DICKEY in splint, sling for comfot  Completed 2 doses of unasyn post-op. Will send home on oral augmentin for 1 week  May move fingers in the splint and shoulder.  Pain control  Dispo: Ok for discharge from ortho perspective, follow up in 1 week in office with Dr. Prieto for wound check.     John Breen PA-C

## 2023-12-30 NOTE — PLAN OF CARE
Problem: PAIN - ADULT  Goal: Verbalizes/displays adequate comfort level or baseline comfort level  Description: Interventions:  - Encourage patient to monitor pain and request assistance  - Assess pain using appropriate pain scale  - Administer analgesics based on type and severity of pain and evaluate response  - Implement non-pharmacological measures as appropriate and evaluate response  - Consider cultural and social influences on pain and pain management  - Notify physician/advanced practitioner if interventions unsuccessful or patient reports new pain  Outcome: Not Progressing     Problem: INFECTION - ADULT  Goal: Absence or prevention of progression during hospitalization  Description: INTERVENTIONS:  - Assess and monitor for signs and symptoms of infection  - Monitor lab/diagnostic results  - Monitor all insertion sites, i.e. indwelling lines, tubes, and drains  - Monitor endotracheal if appropriate and nasal secretions for changes in amount and color  - [place] appropriate cooling/warming therapies per order  - Administer medications as ordered  - Instruct and encourage patient and family to use good hand hygiene technique  - Identify and instruct in appropriate isolation precautions for identified infection/condition  Outcome: Not Progressing  Goal: Absence of fever/infection during neutropenic period  Description: INTERVENTIONS:  - Monitor WBC    Outcome: Not Progressing     Problem: DISCHARGE PLANNING  Goal: Discharge to home or other facility with appropriate resources  Description: INTERVENTIONS:  - Identify barriers to discharge w/patient and caregiver  - Arrange for needed discharge resources and transportation as appropriate  - Identify discharge learning needs (meds, wound care, etc.)  - Arrange for interpretive services to assist at discharge as needed  - Refer to Case Management Department for coordinating discharge planning if the patient needs post-hospital services based on  physician/advanced practitioner order or complex needs related to functional status, cognitive ability, or social support system  Outcome: Not Progressing     Problem: Knowledge Deficit  Goal: Patient/family/caregiver demonstrates understanding of disease process, treatment plan, medications, and discharge instructions  Description: Complete learning assessment and assess knowledge base.  Interventions:  - Provide teaching at level of understanding  - Provide teaching via preferred learning methods  Outcome: Not Progressing

## 2023-12-30 NOTE — ED PROVIDER NOTES
History  Chief Complaint   Patient presents with    Dog Bite     Pt reports being bitten by his dog approx 1 hour PTA. Punctures to bilateral arms and legs. Pt reports hx of surgey in L forearm states he is having a difficult time moving it and has deformation     This is a 68-year-old male who presents to the emergency department after being bit by his dog.  The patient states he was attempting to get in between his dog and his roommate when the dog attacked the roommate.  The patient states this is the second time the dog has bitten him in 2 days.  Denies difficulty moving his arm, but complains of tenderness and pain to the left wrist.  He states he has had a prior surgery there and feels like something is broken his tetanus shot is up-to-date.  He is on antibiotics for his last dog bite.        Prior to Admission Medications   Prescriptions Last Dose Informant Patient Reported? Taking?   amoxicillin-clavulanate (AUGMENTIN) 875-125 mg per tablet   No No   Sig: Take 1 tablet by mouth every 12 (twelve) hours for 7 days      Facility-Administered Medications: None       Past Medical History:   Diagnosis Date    Hypertension     Stroke (HCC)        Past Surgical History:   Procedure Laterality Date    HERNIA REPAIR      ORIF WRIST FRACTURE Left 12/28/2023    Procedure: OPEN REDUCTION W/ INTERNAL FIXATION (ORIF) RADIUS / ULNA (WRIST)- left distal ulna, irrigation and debridement and all necessar procedures;  Surgeon: Kellie Prieto DO;  Location: [facility];  Service: Orthopedics       History reviewed. No pertinent family history.  I have reviewed and agree with the history as documented.    E-Cigarette/Vaping    E-Cigarette Use Never User      E-Cigarette/Vaping Substances     Social History     Tobacco Use    Smoking status: Never    Smokeless tobacco: Never   Vaping Use    Vaping status: Never Used   Substance Use Topics    Alcohol use: Not Currently    Drug use: Never       Review of Systems   All other systems  reviewed and are negative.      Physical Exam  Physical Exam  Constitutional:  Vital signs reviewed, patient appears nontoxic, no acute distress  Eyes: Pupils equal round reactive to light and accommodation, extraocular muscles intact  HEENT: trachea midline, no JVD, moist mucous membranes  Respiratory: lung sounds clear throughout, no rhonchi, no rales  Cardiovascular: regular rate rhythm, no murmurs or rubs  Abdomen: soft, nontender, nondistended, no rebound or guarding  Back: no CVA tenderness, normal inspection  Extremities: no edema, pulses equal in all 4 extremities, the deformity to the left wrist  Neuro: awake, alert, oriented, no focal weakness  Skin: warm, dry, multiple puncture wounds to the left and right arm, hand, 1 small laceration over the left distal forearm, multiple lacerations with sutures in place, no rashes noted    Vital Signs  ED Triage Vitals   Temperature Pulse Respirations Blood Pressure SpO2   12/28/23 1018 12/28/23 1014 12/28/23 1014 12/28/23 1014 12/28/23 1014   98.5 °F (36.9 °C) 92 20 144/75 98 %      Temp Source Heart Rate Source Patient Position - Orthostatic VS BP Location FiO2 (%)   12/28/23 1018 12/28/23 1014 12/28/23 1014 12/28/23 1014 --   Oral Monitor Sitting Right arm       Pain Score       12/28/23 1059       3           Vitals:    12/28/23 1555 12/28/23 1605 12/28/23 1615 12/28/23 1623   BP: 127/60 149/76 142/65 123/56   Pulse: 73 77 76 78   Patient Position - Orthostatic VS:             Visual Acuity      ED Medications  Medications   acetaminophen (TYLENOL) tablet 650 mg (650 mg Oral Given 12/28/23 1059)   ampicillin-sulbactam (UNASYN) 3 g in sodium chloride 0.9 % 100 mL IVPB (3 g Intravenous New Bag 12/28/23 1140)   chlorhexidine (PERIDEX) 0.12 % oral rinse 15 mL (15 mL Swish & Spit Given 12/28/23 1240)   bupivacaine liposomal (EXPAREL) 1.3 % injection 20 mL ( Infiltration MAR Unhold 12/28/23 1240)       Diagnostic Studies  Results Reviewed       None                   XR  wrist 3+ vw left   Final Result by Manuel Sims DO (12/29 2162)      Status post ORIF of the distal radius and ulna.      Extensive subcutaneous emphysema extending proximally along the forearm to the level of the elbow, increased from prior.                  Workstation performed: [id number]         XR wrist 2 vw left   Final Result by Lino Cantu MD (12/29 4253)      Fluoroscopic guidance provided for procedure guidance.  Please refer to the separate procedure notes for additional details.            Workstation performed: [id number]         XR wrist 3+ views LEFT   ED Interpretation by Marco Ramirez DO (12/28 1159)   Distal ulnar fracture        Final Result by Bob Schumacher MD (12/28 1614)      Comminuted distal ulnar fracture with volar displacement.      Soft tissue swelling and subcutaneous emphysema overlying the distal forearm and wrist.            Resident: NAYELI MENENDEZ I, the attending radiologist, have reviewed the images and agree with the final report above.      Workstation performed: [id number]         XR forearm 2 views LEFT   ED Interpretation by Marco Ramirez DO (12/28 1039)   Distal ulnar fracture        Final Result by Bob Schumacher MD (12/28 1614)      Comminuted distal ulnar fracture with volar displacement.      Soft tissue swelling and subcutaneous emphysema overlying the distal forearm and wrist.            Resident: NAYELI MENENDEZ I, the attending radiologist, have reviewed the images and agree with the final report above.      Workstation performed: [id number]                    Procedures  CriticalCare Time    Date/Time: 12/30/2023 9:40 AM    Performed by: Marco Ramirez DO  Authorized by: Marco Ramirez DO    Critical care provider statement:     Critical care time (minutes):  30    Critical care time was exclusive of:  Separately billable procedures and treating other patients and teaching time    Critical care was time  spent personally by me on the following activities:  Obtaining history from patient or surrogate, development of treatment plan with patient or surrogate, discussions with consultants, evaluation of patient's response to treatment, examination of patient, ordering and performing treatments and interventions, ordering and review of laboratory studies, ordering and review of radiographic studies and re-evaluation of patient's condition  Comments:      Open fracture from a dog bite requiring consultation with orthopedics, emergent OR procedure.           ED Course  ED Course as of 12/30/23 0940   Thu Dec 28, 2023   1039 The patient had a x-ray of his left forearm that was independently interpreted by me that shows a distal ulnar fracture.  Given its location next to hardware on the distal radius I will discuss with orthopedics.   1126 Spoke with Ortho. They will take him to the OR for possible washout and fixation.                                SBIRT 20yo+      Flowsheet Row Most Recent Value   Initial Alcohol Screen: US AUDIT-C     1. How often do you have a drink containing alcohol? 0 Filed at: 12/28/2023 1104   2. How many drinks containing alcohol do you have on a typical day you are drinking?  0 Filed at: 12/28/2023 1104   3a. Male UNDER 65: How often do you have five or more drinks on one occasion? 0 Filed at: 12/28/2023 1104   3b. FEMALE Any Age, or MALE 65+: How often do you have 4 or more drinks on one occassion? 0 Filed at: 12/28/2023 1104   Audit-C Score 0 Filed at: 12/28/2023 1104   [place]: How many times in the past year have you...    Used an illegal drug or used a prescription medication for non-medical reasons? Never Filed at: 12/28/2023 1104                      Medical Decision Making  This is a 68-year-old male who presented to the emergency department after a dog bite.  I considered lacerations, puncture wounds, fracture, open fracture. These and other diagnoses were considered.         Amount and/or  Complexity of Data Reviewed  Radiology: ordered and independent interpretation performed.    Risk  OTC drugs.  Decision regarding hospitalization.             Disposition  Final diagnoses:   Ulnar fracture   Dog bite, initial encounter   Laceration of arm, left, multiple sites, initial encounter     Time reflects when diagnosis was documented in both MDM as applicable and the Disposition within this note       Time User Action Codes Description Comment    12/28/2023 11:10 AM John Breen [S52.692B] Other type I or II open fracture of distal end of left ulna, initial encounter     12/28/2023 11:25 AM Marco Ramirez [S52.209A] Ulnar fracture     12/28/2023 11:25 AM Marco Ramirez [W54.0XXA] Dog bite, initial encounter     12/28/2023 11:25 AM Marco Ramirez [S41.112A] Laceration of arm, left, multiple sites, initial encounter     12/28/2023  3:45 PM John Breen [W54.0XXA] Dog bite, initial encounter B/L hands          ED Disposition       ED Disposition   Send to OR    Condition   --    Date/Time   Thu Dec 28, 2023 1125    Comment   --             Follow-up Information    None         Discharge Medication List as of 12/28/2023  4:15 PM        START taking these medications    Details   oxyCODONE (Roxicodone) 5 immediate release tablet Take 1 tablet (5 mg total) by mouth every 4 (four) hours as needed for moderate pain for up to 5 days Max Daily Amount: 30 mg, Starting Thu 12/28/2023, Until Tue 1/2/2024 at 2359, Normal           CONTINUE these medications which have CHANGED    Details   amoxicillin-clavulanate (AUGMENTIN) 875-125 mg per tablet Take 1 tablet by mouth every 12 (twelve) hours for 7 days, Starting Thu 12/28/2023, Until Thu 1/4/2024, Normal             No discharge procedures on file.    PDMP Review         Value Time User    PDMP Reviewed  Yes 12/28/2023  3:46 PM John Breen PA-C            ED Provider  Electronically Signed by             Marco Ramirez DO  12/30/23  0500

## 2024-01-03 NOTE — ANESTHESIA PROCEDURE NOTES
Anesthesia Notable Event  No anethesia notable event occurred.    Date/Time: 1/3/2024 12:22 PM    Performed by: Yuri Nguyen MD  Authorized by: Yuri Nguyen MD

## 2024-01-04 ENCOUNTER — HOSPITAL ENCOUNTER (OUTPATIENT)
Dept: RADIOLOGY | Facility: HOSPITAL | Age: 69
End: 2024-01-04
Attending: ORTHOPAEDIC SURGERY
Payer: COMMERCIAL

## 2024-01-04 ENCOUNTER — OFFICE VISIT (OUTPATIENT)
Dept: OBGYN CLINIC | Facility: CLINIC | Age: 69
End: 2024-01-04

## 2024-01-04 VITALS
HEART RATE: 70 BPM | HEIGHT: 71 IN | BODY MASS INDEX: 26.77 KG/M2 | DIASTOLIC BLOOD PRESSURE: 82 MMHG | WEIGHT: 191.2 LBS | SYSTOLIC BLOOD PRESSURE: 133 MMHG

## 2024-01-04 DIAGNOSIS — Z98.890 S/P DEBRIDEMENT: ICD-10-CM

## 2024-01-04 DIAGNOSIS — Z98.890 S/P DEBRIDEMENT: Primary | ICD-10-CM

## 2024-01-04 PROCEDURE — 99024 POSTOP FOLLOW-UP VISIT: CPT | Performed by: ORTHOPAEDIC SURGERY

## 2024-01-04 PROCEDURE — 73110 X-RAY EXAM OF WRIST: CPT

## 2024-01-04 NOTE — PROGRESS NOTES
[facility]THO CARE [facility] [address] [id number] [phone]       Kasidoris Goldman  [id number]  1955    ORTHOPAEDIC SURGERY OUTPATIENT NOTE  1/4/2024      HISTORY:  68 y.o. male presents for postop status post left ulna ORIF on 12/28 left dog bite washout forearm 12/29.  He is doing well.  He reports his pain is well-controlled.  He denies numbness or tingling in the hand.  He has been compliant with the splint.    Past Medical History:   Diagnosis Date    Hypertension     Stroke (HCC)        Past Surgical History:   Procedure Laterality Date    HERNIA REPAIR      ORIF WRIST FRACTURE Left 12/28/2023    Procedure: OPEN REDUCTION W/ INTERNAL FIXATION (ORIF) RADIUS / ULNA (WRIST)- left distal ulna, irrigation and debridement and all necessar procedures;  Surgeon: Kellie Prieto DO;  Location:  MAIN OR;  Service: Orthopedics    WOUND DEBRIDEMENT Left 12/29/2023    Procedure: DEBRIDEMENT UPPER EXTREMITY (WASH OUT)- left incision and debridement, wound closure and all necessary procedures;  Surgeon: Kellie Prieto DO;  Location:  MAIN OR;  Service: Orthopedics       Social History     Socioeconomic History    Marital status: Single     Spouse name: Not on file    Number of children: Not on file    Years of education: Not on file    Highest education level: Not on file   Occupational History    Not on file   Tobacco Use    Smoking status: Never    Smokeless tobacco: Never   Vaping Use    Vaping status: Never Used   Substance and Sexual Activity    Alcohol use: Not Currently    Drug use: Never    Sexual activity: Not on file   Other Topics Concern    Not on file   Social History Narrative    Not on file     Social Determinants of Health     Financial Resource Strain: Not on file   Food Insecurity: Food Insecurity Present (12/29/2023)    Hunger Vital Sign     Worried About Running Out of Food in the Last Year: Sometimes true     Ran Out  of Food in the Last Year: Never true   Transportation Needs: No Transportation Needs (12/29/2023)    PRAPARE - Transportation     Lack of Transportation (Medical): No     Lack of Transportation (Non-Medical): No   Physical Activity: Not on file   Stress: Not on file   Social Connections: Not on file   Intimate Partner Violence: Not on file   Housing Stability: High Risk (12/29/2023)    Housing Stability Vital Sign     Unable to Pay for Housing in the Last Year: Yes     Number of Places Lived in the Last Year: 1     Unstable Housing in the Last Year: No       History reviewed. No pertinent family history.     Patient's Medications   New Prescriptions    No medications on file   Previous Medications    AMOXICILLIN-CLAVULANATE (AUGMENTIN) 875-125 MG PER TABLET    Take 1 tablet by mouth every 12 (twelve) hours for 7 days    ASPIRIN (ECOTRIN LOW STRENGTH) 81 MG EC TABLET    Take 81 mg by mouth daily   Modified Medications    No medications on file   Discontinued Medications    No medications on file       No Known Allergies     There were no vitals taken for this visit.     REVIEW OF SYSTEMS:  Constitutional: Negative.    HEENT: Negative.    Respiratory: Negative.    Skin: Negative.    Neurological: Negative.    Psychiatric/Behavioral: Negative.  Musculoskeletal: Negative except for that mentioned in the HPI.    There were no vitals taken for this visit.  Gen: No acute distress, resting comfortably in bed  HEENT: Eyes clear, moist mucus membranes, hearing intact  Respiratory: No audible wheezing or stridor  Cardiovascular: Well Perfused peripherally, 2+ distal pulse  Abdomen: nondistended, no peritoneal signs     PHYSICAL EXAM: Left arm:  Wounds are clean dry intact without drainage or erythema  Elbow range of motion is intact  Tender palpation of the wrist  Sensation intact to median, radial, ulnar nerves  Motor intact in median, radial, ulnar nerves    IMAGING: X-rays taken today of the left wrist.  This was  independently interpreted and reviewed.  This demonstrates intact hardware without ulna without displacement or signs of failure    ASSESSMENT AND PLAN:  68 y.o. male status post left distal ulna ORIF and left dog bite washout.  He is doing well.  He will maintain nonweightbearing left upper extremity.  Wounds were dressed and he was placed to a cock up wrist brace.  He will continue to not use that arm.  He will keep the dressing on and clean and dry.  Will see him next week for wound check.    Scribe Attestation      I,:  John Breen PA-C am acting as a scribe while in the presence of the attending physician.:       I,:  Kellie Prieto personally performed the services described in this documentation    as scribed in my presence.:

## 2024-01-04 NOTE — LETTER
January 4, 2024     Patient: Raji Goldman  YOB: 1955  Date of Visit: 1/4/2024      To Whom it May Concern:    Raji Goldman is under my professional care. Raji was seen in my office on 1/4/2024. Raji may return to work without use of the left arm.     If you have any questions or concerns, please don't hesitate to call.         Sincerely,          Kellie Prieto DO        CC: No Recipients

## 2024-01-08 NOTE — PROGRESS NOTES
HIM - LACERATION RESPONSE NOTE    Based on the query that was sent to you, please document the length of the laceration that was repaired below.    3 inches in length

## 2024-01-10 ENCOUNTER — OFFICE VISIT (OUTPATIENT)
Dept: OBGYN CLINIC | Facility: CLINIC | Age: 69
End: 2024-01-10

## 2024-01-10 ENCOUNTER — HOSPITAL ENCOUNTER (OUTPATIENT)
Dept: RADIOLOGY | Facility: HOSPITAL | Age: 69
Discharge: HOME | End: 2024-01-10
Attending: ORTHOPAEDIC SURGERY
Payer: COMMERCIAL

## 2024-01-10 VITALS
HEART RATE: 55 BPM | BODY MASS INDEX: 26.88 KG/M2 | SYSTOLIC BLOOD PRESSURE: 138 MMHG | WEIGHT: 192 LBS | HEIGHT: 71 IN | DIASTOLIC BLOOD PRESSURE: 82 MMHG

## 2024-01-10 DIAGNOSIS — Z98.890 S/P DEBRIDEMENT: Primary | ICD-10-CM

## 2024-01-10 DIAGNOSIS — Z98.890 S/P DEBRIDEMENT: ICD-10-CM

## 2024-01-10 PROCEDURE — 99024 POSTOP FOLLOW-UP VISIT: CPT | Performed by: ORTHOPAEDIC SURGERY

## 2024-01-10 PROCEDURE — 73110 X-RAY EXAM OF WRIST: CPT

## 2024-01-10 NOTE — PROGRESS NOTES
[facility]ARE [facility] [address] [id number] [phone]       Kasidoris Goldman  [id number]  1955    ORTHOPAEDIC SURGERY OUTPATIENT NOTE  1/10/2024      HISTORY:  68 y.o. male presents today 2 weekspostop status post left ulna ORIF on 12/28 left dog bite washout forearm 12/29/23. He has been nonweightbearing left upper extremity in cock up wrist brace. He denies numbness or tingling. His pain is well controlled.     Past Medical History:   Diagnosis Date    Hypertension     Stroke (HCC)        Past Surgical History:   Procedure Laterality Date    HERNIA REPAIR      ORIF WRIST FRACTURE Left 12/28/2023    Procedure: OPEN REDUCTION W/ INTERNAL FIXATION (ORIF) RADIUS / ULNA (WRIST)- left distal ulna, irrigation and debridement and all necessar procedures;  Surgeon: Kellie Prieto DO;  Location:  MAIN OR;  Service: Orthopedics    WOUND DEBRIDEMENT Left 12/29/2023    Procedure: DEBRIDEMENT UPPER EXTREMITY (WASH OUT)- left incision and debridement, wound closure and all necessary procedures;  Surgeon: Kellie Prieto DO;  Location:  MAIN OR;  Service: Orthopedics       Social History     Socioeconomic History    Marital status: Single     Spouse name: Not on file    Number of children: Not on file    Years of education: Not on file    Highest education level: Not on file   Occupational History    Not on file   Tobacco Use    Smoking status: Never    Smokeless tobacco: Never   Vaping Use    Vaping status: Never Used   Substance and Sexual Activity    Alcohol use: Not Currently    Drug use: Never    Sexual activity: Not on file   Other Topics Concern    Not on file   Social History Narrative    Not on file     Social Determinants of Health     Financial Resource Strain: Not on file   Food Insecurity: Food Insecurity Present (12/29/2023)    Hunger Vital Sign     Worried About Running Out of Food in the Last Year: Sometimes true     Ran  "Out of Food in the Last Year: Never true   Transportation Needs: No Transportation Needs (12/29/2023)    PRAPARE - Transportation     Lack of Transportation (Medical): No     Lack of Transportation (Non-Medical): No   Physical Activity: Not on file   Stress: Not on file   Social Connections: Not on file   Intimate Partner Violence: Not on file   Housing Stability: High Risk (12/29/2023)    Housing Stability Vital Sign     Unable to Pay for Housing in the Last Year: Yes     Number of Places Lived in the Last Year: 1     Unstable Housing in the Last Year: No       History reviewed. No pertinent family history.     Patient's Medications   New Prescriptions    No medications on file   Previous Medications    ASPIRIN (ECOTRIN LOW STRENGTH) 81 MG EC TABLET    Take 81 mg by mouth daily   Modified Medications    No medications on file   Discontinued Medications    No medications on file       No Known Allergies     /82 (BP Location: Right arm, Patient Position: Sitting, Cuff Size: Standard)   Pulse 55   Ht 5' 11\" (1.803 m)   Wt 87.1 kg (192 lb)   BMI 26.78 kg/m²      REVIEW OF SYSTEMS:  Constitutional: Negative.    HEENT: Negative.    Respiratory: Negative.    Skin: Negative.    Neurological: Negative.    Psychiatric/Behavioral: Negative.  Musculoskeletal: Negative except for that mentioned in the HPI.    PHYSICAL EXAM:   Left wrist   Sutures intact over wounds and incision site  Wounds and incision site healing well  No erythema   Resolving ecchymosis throughout the forearm and wrist region    IMAGING: X-ray left wrist demonstrates s/p ORIF  intact hardware without ulna without displacement or signs of failure     ASSESSMENT AND PLAN:  68 y.o. male  2 weekspostop status post left ulna ORIF on 12/28 left dog bite washout forearm 12/29/23.      XR left wrist was reviewed which showed no evidence of hardware failure.Sutures were removed in the office today and steri strips were applied over the incision and wound " sites. He is to continue wearing cock up wrist brace at all times, he is to remove the brace when showering and getting dressed. OT order was given out today for gentle range of motion exercises for the next 4 weeks and start strengthening after f4 weeks for range of motion. He is to follow up in 2 weeks for wound check and repeat x-ray left wrist .       Scribe Attestation      I,:  Constantine Delgadillo am acting as a scribe while in the presence of the attending physician.:       I,:  Kellie Prieto, DO personally performed the services described in this documentation    as scribed in my presence.:

## 2024-01-24 ENCOUNTER — OFFICE VISIT (OUTPATIENT)
Dept: OBGYN CLINIC | Facility: CLINIC | Age: 69
End: 2024-01-24

## 2024-01-24 ENCOUNTER — HOSPITAL ENCOUNTER (OUTPATIENT)
Dept: RADIOLOGY | Facility: HOSPITAL | Age: 69
Discharge: HOME | End: 2024-01-24
Attending: ORTHOPAEDIC SURGERY
Payer: COMMERCIAL

## 2024-01-24 VITALS
SYSTOLIC BLOOD PRESSURE: 155 MMHG | DIASTOLIC BLOOD PRESSURE: 84 MMHG | HEART RATE: 57 BPM | BODY MASS INDEX: 26.88 KG/M2 | HEIGHT: 71 IN | WEIGHT: 192 LBS

## 2024-01-24 DIAGNOSIS — Z98.890 S/P DEBRIDEMENT: Primary | ICD-10-CM

## 2024-01-24 DIAGNOSIS — Z98.890 S/P DEBRIDEMENT: ICD-10-CM

## 2024-01-24 PROCEDURE — 99024 POSTOP FOLLOW-UP VISIT: CPT | Performed by: ORTHOPAEDIC SURGERY

## 2024-01-24 PROCEDURE — 73110 X-RAY EXAM OF WRIST: CPT

## 2024-01-24 NOTE — PROGRESS NOTES
ORTHO CARE [facility] [address] [id number] [phone]       Kasidoris Goldman  [id number]  1955    ORTHOPAEDIC SURGERY OUTPATIENT NOTE  1/24/2024      HISTORY:  68 y.o. male patient presents for 4-week follow-up left distal ulna ORIF.  Overall patient is doing well.  He does complain of stiffness of the wrist especially with pronation supination.  He did have an I&D of an open wounds which have fully healed at this point.      Past Medical History:   Diagnosis Date    Hypertension     Stroke (HCC)        Past Surgical History:   Procedure Laterality Date    HERNIA REPAIR      ORIF WRIST FRACTURE Left 12/28/2023    Procedure: OPEN REDUCTION W/ INTERNAL FIXATION (ORIF) RADIUS / ULNA (WRIST)- left distal ulna, irrigation and debridement and all necessar procedures;  Surgeon: Kellie Prieto DO;  Location:  MAIN OR;  Service: Orthopedics    WOUND DEBRIDEMENT Left 12/29/2023    Procedure: DEBRIDEMENT UPPER EXTREMITY (WASH OUT)- left incision and debridement, wound closure and all necessary procedures;  Surgeon: Kellie Prieto DO;  Location:  MAIN OR;  Service: Orthopedics       Social History     Socioeconomic History    Marital status: Single     Spouse name: Not on file    Number of children: Not on file    Years of education: Not on file    Highest education level: Not on file   Occupational History    Not on file   Tobacco Use    Smoking status: Never    Smokeless tobacco: Never   Vaping Use    Vaping status: Never Used   Substance and Sexual Activity    Alcohol use: Not Currently    Drug use: Never    Sexual activity: Not on file   Other Topics Concern    Not on file   Social History Narrative    Not on file     Social Determinants of Health     Financial Resource Strain: Not on file   Food Insecurity: Food Insecurity Present (12/29/2023)    Hunger Vital Sign     Worried About Running Out of Food in the Last Year: Sometimes  "true     Ran Out of Food in the Last Year: Never true   Transportation Needs: No Transportation Needs (12/29/2023)    PRAPARE - Transportation     Lack of Transportation (Medical): No     Lack of Transportation (Non-Medical): No   Physical Activity: Not on file   Stress: Not on file   Social Connections: Not on file   Intimate Partner Violence: Not on file   Housing Stability: High Risk (12/29/2023)    Housing Stability Vital Sign     Unable to Pay for Housing in the Last Year: Yes     Number of Places Lived in the Last Year: 1     Unstable Housing in the Last Year: No       History reviewed. No pertinent family history.     Patient's Medications   New Prescriptions    No medications on file   Previous Medications    ASPIRIN (ECOTRIN LOW STRENGTH) 81 MG EC TABLET    Take 81 mg by mouth daily   Modified Medications    No medications on file   Discontinued Medications    No medications on file       No Known Allergies     /84 (BP Location: Right arm, Patient Position: Sitting, Cuff Size: Standard)   Pulse 57   Ht 5' 11\" (1.803 m)   Wt 87.1 kg (192 lb)   BMI 26.78 kg/m²      REVIEW OF SYSTEMS:  Constitutional: Negative.    HEENT: Negative.    Respiratory: Negative.    Skin: Negative.    Neurological: Negative.    Psychiatric/Behavioral: Negative.  Musculoskeletal: Negative except for that mentioned in the HPI.    [unfilled]     PHYSICAL EXAM: Left upper extremity: Neurovascular intact.  Forearm wounds are completely healed with no signs of infection.  Surgical site wound healed with no signs infection.  Wrist extension 5 degrees.  Wrist flexion 25 degrees.  Full supination.  Pronation approximately 20 degrees.    IMAGING: Left wrist: Status post ORIF distal ulna with implant in good position no signs of eber-implant failure.  There is some evidence of mild callus formation.    ASSESSMENT AND PLAN:  68 y.o. male 4 weeks status post left distal ulna ORIF.    At this time get the patient to occupational " therapy.  He will follow-up in 4 weeks for repeat x-ray and repeat clinical exam.

## 2024-03-18 ENCOUNTER — OFFICE VISIT (OUTPATIENT)
Dept: GASTROENTEROLOGY | Facility: CLINIC | Age: 69
End: 2024-03-18
Payer: COMMERCIAL

## 2024-03-18 VITALS
HEART RATE: 63 BPM | SYSTOLIC BLOOD PRESSURE: 173 MMHG | HEIGHT: 71 IN | DIASTOLIC BLOOD PRESSURE: 85 MMHG | BODY MASS INDEX: 26.6 KG/M2 | WEIGHT: 190 LBS

## 2024-03-18 DIAGNOSIS — R14.0 BLOATING: ICD-10-CM

## 2024-03-18 DIAGNOSIS — D64.9 ANEMIA, UNSPECIFIED TYPE: ICD-10-CM

## 2024-03-18 DIAGNOSIS — R63.0 LOSS OF APPETITE: ICD-10-CM

## 2024-03-18 DIAGNOSIS — K62.5 RECTAL BLEED: Primary | ICD-10-CM

## 2024-03-18 PROCEDURE — 99203 OFFICE O/P NEW LOW 30 MIN: CPT | Performed by: PHYSICIAN ASSISTANT

## 2024-03-18 PROCEDURE — 99213 OFFICE O/P EST LOW 20 MIN: CPT | Performed by: PHYSICIAN ASSISTANT

## 2024-03-18 RX ORDER — AMLODIPINE BESYLATE 5 MG/1
5 TABLET ORAL DAILY
COMMUNITY

## 2024-03-18 RX ORDER — LOSARTAN POTASSIUM AND HYDROCHLOROTHIAZIDE 25; 100 MG/1; MG/1
1 TABLET ORAL DAILY
COMMUNITY

## 2024-03-18 NOTE — PROGRESS NOTES
[facility] - Outpatient Consultation  Raji Goldman 68 y.o. male MRN: [id number]  Encounter: [id number]          ASSESSMENT AND PLAN:      1. Rectal bleed  Patient with rectal bleeding, patient did have anemia on blood work at the end of December with hemoglobin of 11 but we will repeat at this time, rule out outlet pathology, rule out on the underlying malignancy as patient was noted to be anemic and does have loss of appetite and bloating symptoms  - polyethylene glycol (GOLYTELY) 4000 mL solution; Take 4,000 mL by mouth once for 1 dose  Dispense: 4000 mL; Refill: 0  - Colonoscopy; Future    2. Anemia, unspecified type  Will repeat CBC along with iron studies CMP and thyroid and he reports he will get blood work tomorrow  - CBC and differential; Future  - Comprehensive metabolic panel; Future  - Iron Panel (Includes Ferritin, Iron Sat%, Iron, and TIBC); Future  - TSH, 3rd generation with Free T4 reflex; Future  - EGD; Future    3. Loss of appetite  - EGD; Future  4. Bloating  Patient with loss of appetite and bloating and would recommend EGD at time of colonoscopy for further evaluation    Patient will be scheduled for EGD and colonoscopy next week and have asked him to get blood work prior to these just to make sure he does not have continual downtrend of hemoglobin, further recommendations will be made once procedures have been performed    ______________________________________________________________________    HPI:    This is a 68-year-old male who presents for rectal bleeding.  States that he has been having rectal bleeding for about a month and he reports that this occurs primarily with wiping.  Reports that stool is normal brown in color.  He reports that he broke his wrist that he was getting blood work performed which showed that his hemoglobin was low at 11.  Patient denies any melena or any significant reflux symptoms.  Patient is not aware of family history of  gastrointestinal malignancy.  He otherwise states he has had a decreased appetite and does feel abdominal bloating where he states that abdomen seems to be more distended but has been eating less.  Denies any change in bowel habits such as constipation or diarrhea.  He had distant history of colonoscopy and he reports that he had pain after the procedure but he cannot recall the findings.  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  Patient with rectal bleeding            REVIEW OF SYSTEMS:    CONSTITUTIONAL: Denies any fever, chills, rigors, and weight loss.  HEENT: No earache or tinnitus. Denies hearing loss or visual disturbances.  CARDIOVASCULAR: No chest pain or palpitations.   RESPIRATORY: Denies any cough, hemoptysis, shortness of breath or dyspnea on exertion.  GASTROINTESTINAL: As noted in the History of Present Illness.   GENITOURINARY: No problems with urination. Denies any hematuria or dysuria.  NEUROLOGIC: No dizziness or vertigo, denies headaches.   MUSCULOSKELETAL: Denies any muscle or joint pain.   SKIN: Denies skin rashes or itching.   ENDOCRINE: Denies excessive thirst. Denies intolerance to heat or cold.  PSYCHOSOCIAL: Denies depression or anxiety. Denies any recent memory loss.       Historical Information   Past Medical History:   Diagnosis Date    Hypertension     Stroke (HCC)      Past Surgical History:   Procedure Laterality Date    HERNIA REPAIR      ORIF WRIST FRACTURE Left 12/28/2023    Procedure: OPEN REDUCTION W/ INTERNAL FIXATION (ORIF) RADIUS / ULNA (WRIST)- left distal ulna, irrigation and debridement and all necessar procedures;  Surgeon: Kellie Prieto DO;  Location:  MAIN OR;  Service: Orthopedics    WOUND DEBRIDEMENT Left 12/29/2023    Procedure: DEBRIDEMENT UPPER EXTREMITY (WASH OUT)- left incision and debridement, wound closure and all necessary procedures;  Surgeon: Kellie Prieto DO;  Location:  MAIN OR;  Service: Orthopedics  "    Social History   Social History     Substance and Sexual Activity   Alcohol Use Not Currently     Social History     Substance and Sexual Activity   Drug Use Never     Social History     Tobacco Use   Smoking Status Never   Smokeless Tobacco Never     History reviewed. No pertinent family history.    Meds/Allergies       Current Outpatient Medications:     amLODIPine (Norvasc) 5 mg tablet    aspirin (ECOTRIN LOW STRENGTH) 81 mg EC tablet    losartan-hydrochlorothiazide (HYZAAR) 100-25 MG per tablet    polyethylene glycol (GOLYTELY) 4000 mL solution    saw palmetto 500 MG capsule    No Known Allergies        Objective     Blood pressure (!) 173/85, pulse 63, height 5' 11\" (1.803 m), weight 86.2 kg (190 lb). Body mass index is 26.5 kg/m².        PHYSICAL EXAM:      General Appearance:   Alert, cooperative, no distress   HEENT:   Normocephalic, atraumatic, anicteric.     Neck:  Supple, symmetrical, trachea midline   Lungs:   Clear to auscultation bilaterally; no rales, rhonchi or wheezing; respirations unlabored    Heart::   Regular rate and rhythm; no murmur, rub, or gallop.   Abdomen:   Soft, non-tender, non-distended; normal bowel sounds; no masses, no organomegaly    Genitalia:   Deferred    Rectal:   Deferred    Extremities:  No cyanosis, clubbing or edema    Pulses:  2+ and symmetric    Skin:  No jaundice, rashes, or lesions    Lymph nodes:  No palpable cervical lymphadenopathy        Lab Results:   No visits with results within 1 Day(s) from this visit.   Latest known visit with results is:   Admission on 12/28/2023, Discharged on 12/30/2023   Component Date Value    WBC 12/28/2023 9.79     RBC 12/28/2023 4.26     Hemoglobin 12/28/2023 12.8     Hematocrit 12/28/2023 38.2     MCV 12/28/2023 90     MCH 12/28/2023 30.0     MCHC 12/28/2023 33.5     RDW 12/28/2023 13.8     MPV 12/28/2023 8.7 (L)     Platelets 12/28/2023 186     nRBC 12/28/2023 0     Neutrophils Relative 12/28/2023 90 (H)     Immature Grans % " 12/28/2023 0     Lymphocytes Relative 12/28/2023 6 (L)     Monocytes Relative 12/28/2023 4     Eosinophils Relative 12/28/2023 0     Basophils Relative 12/28/2023 0     Neutrophils Absolute 12/28/2023 8.80 (H)     Absolute Immature Grans 12/28/2023 0.02     Absolute Lymphocytes 12/28/2023 0.57 (L)     Absolute Monocytes 12/28/2023 0.38     Eosinophils Absolute 12/28/2023 0.01     Basophils Absolute 12/28/2023 0.01     Sodium 12/28/2023 140     Potassium 12/28/2023 3.6     Chloride 12/28/2023 105     CO2 12/28/2023 26     ANION GAP 12/28/2023 9     BUN 12/28/2023 17     Creatinine 12/28/2023 1.01     Glucose 12/28/2023 155 (H)     Calcium 12/28/2023 9.1     eGFR 12/28/2023 77     Sodium 12/30/2023 141     Potassium 12/30/2023 4.0     Chloride 12/30/2023 106     CO2 12/30/2023 27     ANION GAP 12/30/2023 8     BUN 12/30/2023 19     Creatinine 12/30/2023 1.05     Glucose 12/30/2023 127     Calcium 12/30/2023 8.4     eGFR 12/30/2023 73     WBC 12/30/2023 8.48     RBC 12/30/2023 3.68 (L)     Hemoglobin 12/30/2023 11.0 (L)     Hematocrit 12/30/2023 33.7 (L)     MCV 12/30/2023 92     MCH 12/30/2023 29.9     MCHC 12/30/2023 32.6     RDW 12/30/2023 14.0     Platelets 12/30/2023 192     MPV 12/30/2023 9.6     Magnesium 12/30/2023 2.1          Radiology Results:   No results found.   Patient baseline mental status/Awake

## 2024-03-19 ENCOUNTER — ANESTHESIA (OUTPATIENT)
Dept: ANESTHESIOLOGY | Facility: AMBULATORY SURGERY CENTER | Age: 69
End: 2024-03-19

## 2024-03-19 ENCOUNTER — ANESTHESIA EVENT (OUTPATIENT)
Dept: ANESTHESIOLOGY | Facility: AMBULATORY SURGERY CENTER | Age: 69
End: 2024-03-19

## 2024-03-26 ENCOUNTER — ANESTHESIA EVENT (OUTPATIENT)
Dept: GASTROENTEROLOGY | Facility: AMBULATORY SURGERY CENTER | Age: 69
End: 2024-03-26

## 2024-03-26 ENCOUNTER — HOSPITAL ENCOUNTER (OUTPATIENT)
Dept: GASTROENTEROLOGY | Facility: AMBULATORY SURGERY CENTER | Age: 69
Discharge: HOME | End: 2024-03-26
Payer: COMMERCIAL

## 2024-03-26 ENCOUNTER — ANESTHESIA (OUTPATIENT)
Dept: GASTROENTEROLOGY | Facility: AMBULATORY SURGERY CENTER | Age: 69
End: 2024-03-26

## 2024-03-26 VITALS
HEART RATE: 54 BPM | BODY MASS INDEX: 26.6 KG/M2 | DIASTOLIC BLOOD PRESSURE: 62 MMHG | RESPIRATION RATE: 18 BRPM | WEIGHT: 190 LBS | OXYGEN SATURATION: 97 % | HEIGHT: 71 IN | TEMPERATURE: 96.8 F | SYSTOLIC BLOOD PRESSURE: 160 MMHG

## 2024-03-26 DIAGNOSIS — K62.5 RECTAL BLEED: ICD-10-CM

## 2024-03-26 DIAGNOSIS — D64.9 ANEMIA, UNSPECIFIED TYPE: ICD-10-CM

## 2024-03-26 DIAGNOSIS — K20.80 ESOPHAGITIS, LOS ANGELES GRADE B: Primary | ICD-10-CM

## 2024-03-26 DIAGNOSIS — R63.0 LOSS OF APPETITE: ICD-10-CM

## 2024-03-26 PROBLEM — G31.84 MCI (MILD COGNITIVE IMPAIRMENT) WITH MEMORY LOSS: Status: ACTIVE | Noted: 2018-11-06

## 2024-03-26 PROCEDURE — 45385 COLONOSCOPY W/LESION REMOVAL: CPT | Performed by: INTERNAL MEDICINE

## 2024-03-26 PROCEDURE — 43239 EGD BIOPSY SINGLE/MULTIPLE: CPT | Performed by: INTERNAL MEDICINE

## 2024-03-26 PROCEDURE — 88305 TISSUE EXAM BY PATHOLOGIST: CPT | Performed by: PATHOLOGY

## 2024-03-26 RX ORDER — OMEPRAZOLE 40 MG/1
40 CAPSULE, DELAYED RELEASE ORAL
Qty: 180 CAPSULE | Refills: 0 | Status: SHIPPED | OUTPATIENT
Start: 2024-03-26 | End: 2024-06-24

## 2024-03-26 RX ORDER — PANTOPRAZOLE SODIUM 40 MG/1
40 TABLET, DELAYED RELEASE ORAL DAILY
Qty: 90 TABLET | Refills: 0 | Status: SHIPPED | OUTPATIENT
Start: 2024-03-26 | End: 2024-06-24

## 2024-03-26 RX ORDER — PROPOFOL 10 MG/ML
INJECTION, EMULSION INTRAVENOUS AS NEEDED
Status: DISCONTINUED | OUTPATIENT
Start: 2024-03-26 | End: 2024-03-26

## 2024-03-26 RX ORDER — SODIUM CHLORIDE, SODIUM LACTATE, POTASSIUM CHLORIDE, CALCIUM CHLORIDE 600; 310; 30; 20 MG/100ML; MG/100ML; MG/100ML; MG/100ML
125 INJECTION, SOLUTION INTRAVENOUS CONTINUOUS
Status: DISCONTINUED | OUTPATIENT
Start: 2024-03-26 | End: 2024-03-30 | Stop reason: HOSPADM

## 2024-03-26 RX ADMIN — PROPOFOL 50 MG: 10 INJECTION, EMULSION INTRAVENOUS at 09:24

## 2024-03-26 RX ADMIN — PROPOFOL 50 MG: 10 INJECTION, EMULSION INTRAVENOUS at 09:32

## 2024-03-26 RX ADMIN — PROPOFOL 50 MG: 10 INJECTION, EMULSION INTRAVENOUS at 09:13

## 2024-03-26 RX ADMIN — PROPOFOL 100 MG: 10 INJECTION, EMULSION INTRAVENOUS at 09:08

## 2024-03-26 RX ADMIN — PROPOFOL 50 MG: 10 INJECTION, EMULSION INTRAVENOUS at 09:28

## 2024-03-26 RX ADMIN — PROPOFOL 100 MG: 10 INJECTION, EMULSION INTRAVENOUS at 09:10

## 2024-03-26 RX ADMIN — PROPOFOL 50 MG: 10 INJECTION, EMULSION INTRAVENOUS at 09:16

## 2024-03-26 RX ADMIN — PROPOFOL 50 MG: 10 INJECTION, EMULSION INTRAVENOUS at 09:20

## 2024-03-26 RX ADMIN — SODIUM CHLORIDE, SODIUM LACTATE, POTASSIUM CHLORIDE, CALCIUM CHLORIDE: 600; 310; 30; 20 INJECTION, SOLUTION INTRAVENOUS at 09:39

## 2024-03-26 RX ADMIN — SODIUM CHLORIDE, SODIUM LACTATE, POTASSIUM CHLORIDE, CALCIUM CHLORIDE: 600; 310; 30; 20 INJECTION, SOLUTION INTRAVENOUS at 08:52

## 2024-03-26 NOTE — ANESTHESIA PREPROCEDURE EVALUATION
Procedure:  COLONOSCOPY  EGD    Relevant Problems   CARDIO   (+) HTN (hypertension)      Surgery/Wound/Pain   (+) Dog bite   (+) Open wound of arm   (+) S/P debridement   (+) Type I or II open fracture of left ulna             Anesthesia Plan  ASA Score- 2     Anesthesia Type- IV sedation with anesthesia with ASA Monitors.         Additional Monitors:     Airway Plan:            Plan Factors-Exercise tolerance (METS): >4 METS.    Chart reviewed. EKG reviewed. Imaging results reviewed. Existing labs reviewed. Patient summary reviewed.                  Induction- intravenous.    Postoperative Plan-     Informed Consent-

## 2024-03-26 NOTE — H&P
History and Physical -  [facility]  Raji Goldman 68 y.o. male MRN: [id number]    HPI: Raji Goldman is a 68 y.o. year old male who presents for evaluation of rectal bleeding and anemia workup.      Review of Systems    Historical Information   Past Medical History:   Diagnosis Date    Hypertension     Stroke (HCC)      Past Surgical History:   Procedure Laterality Date    HERNIA REPAIR      ORIF WRIST FRACTURE Left 12/28/2023    Procedure: OPEN REDUCTION W/ INTERNAL FIXATION (ORIF) RADIUS / ULNA (WRIST)- left distal ulna, irrigation and debridement and all necessar procedures;  Surgeon: Kellie Prieto DO;  Location:  MAIN OR;  Service: Orthopedics    WOUND DEBRIDEMENT Left 12/29/2023    Procedure: DEBRIDEMENT UPPER EXTREMITY (WASH OUT)- left incision and debridement, wound closure and all necessary procedures;  Surgeon: Kellie Prieto DO;  Location:  MAIN OR;  Service: Orthopedics     Social History   Social History     Substance and Sexual Activity   Alcohol Use Not Currently     Social History     Substance and Sexual Activity   Drug Use Never     Social History     Tobacco Use   Smoking Status Never   Smokeless Tobacco Never     No family history on file.    Meds/Allergies     (Not in a hospital admission)      No Known Allergies    Objective     There were no vitals taken for this visit.      PHYSICAL EXAM    Gen: NAD  CV: RRR  CHEST: Clear  ABD: soft, NT/ND  EXT: no edema  Neuro: AAO      ASSESSMENT/PLAN:  This is a 68 y.o. year old male here for anemia and rectal bleeding.    PLAN:   Procedure: EGD and colonoscopy.       4 = completely dependent

## 2024-03-26 NOTE — ANESTHESIA POSTPROCEDURE EVALUATION
Post-Op Assessment Note    CV Status:  Stable  Pain Score: 0    Pain management: adequate       Mental Status:  Alert and awake   Hydration Status:  Euvolemic   PONV Controlled:  Controlled   Airway Patency:  Patent     Post Op Vitals Reviewed: Yes    No anethesia notable event occurred.    Staff: CRNA               BP   98/61   Temp   98   Pulse  57   Resp   16   SpO2   99

## 2024-03-28 PROCEDURE — 88305 TISSUE EXAM BY PATHOLOGIST: CPT | Performed by: PATHOLOGY

## 2024-04-03 ENCOUNTER — HOSPITAL ENCOUNTER (OUTPATIENT)
Dept: CT IMAGING | Facility: HOSPITAL | Age: 69
Discharge: HOME | End: 2024-04-03
Attending: INTERNAL MEDICINE
Payer: COMMERCIAL

## 2024-04-03 DIAGNOSIS — R63.0 LOSS OF APPETITE: ICD-10-CM

## 2024-04-03 PROCEDURE — 74177 CT ABD & PELVIS W/CONTRAST: CPT

## 2024-04-03 RX ADMIN — IOHEXOL 85 ML: 350 INJECTION, SOLUTION INTRAVENOUS at 09:39

## 2024-04-04 DIAGNOSIS — K20.90 ESOPHAGITIS: Primary | ICD-10-CM

## 2024-04-04 RX ORDER — PANTOPRAZOLE SODIUM 40 MG/1
40 TABLET, DELAYED RELEASE ORAL
Qty: 60 TABLET | Refills: 0 | Status: SHIPPED | OUTPATIENT
Start: 2024-04-04 | End: 2024-05-04

## 2024-04-22 ENCOUNTER — TELEPHONE (OUTPATIENT)
Dept: GASTROENTEROLOGY | Facility: CLINIC | Age: 69
End: 2024-04-22

## 2024-04-22 NOTE — TELEPHONE ENCOUNTER
Letter was sent after trying to contact the patient but was returned to the office . Address is the one in the system.   I have sent the patient a message thru [facility]. To call and schedule his follow up for July  for an OV..  
none

## 2024-04-23 DIAGNOSIS — Z00.6 ENCOUNTER FOR EXAMINATION FOR NORMAL COMPARISON OR CONTROL IN CLINICAL RESEARCH PROGRAM: ICD-10-CM

## 2024-10-10 ENCOUNTER — PREP FOR PROCEDURE (OUTPATIENT)
Dept: GASTROENTEROLOGY | Facility: CLINIC | Age: 69
End: 2024-10-10

## 2024-10-10 ENCOUNTER — TELEPHONE (OUTPATIENT)
Dept: GASTROENTEROLOGY | Facility: CLINIC | Age: 69
End: 2024-10-10

## 2024-10-10 DIAGNOSIS — K21.00 GASTROESOPHAGEAL REFLUX DISEASE WITH ESOPHAGITIS, UNSPECIFIED WHETHER HEMORRHAGE: Primary | ICD-10-CM

## 2024-10-10 NOTE — TELEPHONE ENCOUNTER
Scheduled date of EGD(as of today): 1/9/25  Physician performing EGD: Dr. Andrade  Location of EGD: [facility]  Instructions reviewed with patient by: tl mailing to address on file  Clearances: n/a

## 2024-12-24 ENCOUNTER — ANESTHESIA EVENT (OUTPATIENT)
Dept: ANESTHESIOLOGY | Facility: HOSPITAL | Age: 69
End: 2024-12-24

## 2024-12-24 ENCOUNTER — ANESTHESIA (OUTPATIENT)
Dept: ANESTHESIOLOGY | Facility: HOSPITAL | Age: 69
End: 2024-12-24

## 2025-01-02 ENCOUNTER — TELEPHONE (OUTPATIENT)
Dept: GASTROENTEROLOGY | Facility: AMBULARY SURGERY CENTER | Age: 70
End: 2025-01-02

## 2025-01-09 ENCOUNTER — HOSPITAL ENCOUNTER (OUTPATIENT)
Dept: GASTROENTEROLOGY | Facility: AMBULARY SURGERY CENTER | Age: 70
Setting detail: OUTPATIENT SURGERY
End: 2025-01-09
Attending: INTERNAL MEDICINE
Payer: COMMERCIAL

## 2025-01-09 ENCOUNTER — ANESTHESIA EVENT (OUTPATIENT)
Dept: GASTROENTEROLOGY | Facility: AMBULARY SURGERY CENTER | Age: 70
End: 2025-01-09
Payer: COMMERCIAL

## 2025-01-09 VITALS
SYSTOLIC BLOOD PRESSURE: 145 MMHG | OXYGEN SATURATION: 98 % | WEIGHT: 193 LBS | TEMPERATURE: 97.1 F | HEART RATE: 45 BPM | HEIGHT: 71 IN | RESPIRATION RATE: 18 BRPM | DIASTOLIC BLOOD PRESSURE: 79 MMHG | BODY MASS INDEX: 27.02 KG/M2

## 2025-01-09 DIAGNOSIS — K21.00 GASTROESOPHAGEAL REFLUX DISEASE WITH ESOPHAGITIS, UNSPECIFIED WHETHER HEMORRHAGE: ICD-10-CM

## 2025-01-09 PROCEDURE — 88312 SPECIAL STAINS GROUP 1: CPT | Performed by: PATHOLOGY

## 2025-01-09 PROCEDURE — 88305 TISSUE EXAM BY PATHOLOGIST: CPT | Performed by: PATHOLOGY

## 2025-01-09 PROCEDURE — 88342 IMHCHEM/IMCYTCHM 1ST ANTB: CPT | Performed by: PATHOLOGY

## 2025-01-09 PROCEDURE — 43239 EGD BIOPSY SINGLE/MULTIPLE: CPT | Performed by: INTERNAL MEDICINE

## 2025-01-09 PROCEDURE — 88341 IMHCHEM/IMCYTCHM EA ADD ANTB: CPT | Performed by: PATHOLOGY

## 2025-01-09 RX ORDER — PROPOFOL 10 MG/ML
INJECTION, EMULSION INTRAVENOUS AS NEEDED
Status: DISCONTINUED | OUTPATIENT
Start: 2025-01-09 | End: 2025-01-09

## 2025-01-09 RX ORDER — LIDOCAINE HYDROCHLORIDE 10 MG/ML
INJECTION, SOLUTION EPIDURAL; INFILTRATION; INTRACAUDAL; PERINEURAL AS NEEDED
Status: DISCONTINUED | OUTPATIENT
Start: 2025-01-09 | End: 2025-01-09

## 2025-01-09 RX ORDER — SODIUM CHLORIDE, SODIUM LACTATE, POTASSIUM CHLORIDE, CALCIUM CHLORIDE 600; 310; 30; 20 MG/100ML; MG/100ML; MG/100ML; MG/100ML
100 INJECTION, SOLUTION INTRAVENOUS CONTINUOUS
Status: CANCELLED | OUTPATIENT
Start: 2025-01-09

## 2025-01-09 RX ORDER — SODIUM CHLORIDE, SODIUM LACTATE, POTASSIUM CHLORIDE, CALCIUM CHLORIDE 600; 310; 30; 20 MG/100ML; MG/100ML; MG/100ML; MG/100ML
INJECTION, SOLUTION INTRAVENOUS CONTINUOUS PRN
Status: DISCONTINUED | OUTPATIENT
Start: 2025-01-09 | End: 2025-01-09

## 2025-01-09 RX ADMIN — LIDOCAINE HYDROCHLORIDE 50 MG: 10 INJECTION, SOLUTION EPIDURAL; INFILTRATION; INTRACAUDAL at 10:24

## 2025-01-09 RX ADMIN — PROPOFOL 120 MG: 10 INJECTION, EMULSION INTRAVENOUS at 10:24

## 2025-01-09 RX ADMIN — SODIUM CHLORIDE, SODIUM LACTATE, POTASSIUM CHLORIDE, AND CALCIUM CHLORIDE: .6; .31; .03; .02 INJECTION, SOLUTION INTRAVENOUS at 10:18

## 2025-01-09 RX ADMIN — PROPOFOL 40 MG: 10 INJECTION, EMULSION INTRAVENOUS at 10:26

## 2025-01-09 NOTE — H&P
"History and Physical -  [facility]  Raji Goldman 69 y.o. male MRN: [id number]    HPI: Raji Goldman is a 69 y.o. year old male who presents for evaluation of esophagitis.      Review of Systems    Historical Information   Past Medical History:   Diagnosis Date    Anemia     Anxiety     Depression     Hypertension     Rectal bleeding     Sleep apnea     history of sleep apnea with CPAP. Patient lost weight and stopped snoring and no longer uses    TIA (transient ischemic attack)     around 20 years ago -very mild per patient  3/26/24     Past Surgical History:   Procedure Laterality Date    HERNIA REPAIR      ORIF WRIST FRACTURE Left 2023    Procedure: OPEN REDUCTION W/ INTERNAL FIXATION (ORIF) RADIUS / ULNA (WRIST)- left distal ulna, irrigation and debridement and all necessar procedures;  Surgeon: Kellie Prieto DO;  Location:  MAIN OR;  Service: Orthopedics    WOUND DEBRIDEMENT Left 2023    Procedure: DEBRIDEMENT UPPER EXTREMITY (WASH OUT)- left incision and debridement, wound closure and all necessary procedures;  Surgeon: Kellie Prieto DO;  Location: [facility];  Service: Orthopedics     Social History   Social History     Substance and Sexual Activity   Alcohol Use Not Currently     Social History     Substance and Sexual Activity   Drug Use Yes    Types: Marijuana    Comment: tincture and gummies-last used 3 days ago     Social History     Tobacco Use   Smoking Status Former    Current packs/day: 0.00    Types: Cigarettes    Quit date:     Years since quittin.0   Smokeless Tobacco Never     Family History   Adopted: Yes       Meds/Allergies     Not in a hospital admission.    No Known Allergies    Objective     /70   Pulse 56   Temp (!) 97.2 °F (36.2 °C) (Temporal)   Resp 18   Ht 5' 11\" (1.803 m)   Wt 87.5 kg (193 lb)   SpO2 97%   BMI 26.92 kg/m²       PHYSICAL EXAM    Gen: NAD  CV: RRR  CHEST: Clear  ABD: soft, NT/ND  EXT: no edema  Neuro: " AAO      ASSESSMENT/PLAN:  This is a 69 y.o. year old male here for evaluation of esophagitis.    PLAN:   Procedure: EGD.

## 2025-01-09 NOTE — ANESTHESIA PREPROCEDURE EVALUATION
Medical History    History Comments   Hypertension    TIA (transient ischemic attack) around 20 years ago -very mild per patient  3/26/24   Sleep apnea history of sleep apnea with CPAP. Patient lost weight and stopped snoring and no longer uses   Anxiety    Depression    Rectal bleeding    Anemia    Procedure:  EGD    Relevant Problems   ANESTHESIA (within normal limits)      CARDIO   (+) HTN (hypertension)        Physical Exam    Airway    Mallampati score: II  TM Distance: >3 FB  Neck ROM: full     Dental       Cardiovascular  Rate: normal    Pulmonary  Pulmonary exam normal     Other Findings  Per pt denies anything remaining that is loose or removeable      Anesthesia Plan  ASA Score- 2     Anesthesia Type- IV sedation with anesthesia with ASA Monitors.         Additional Monitors:     Airway Plan:            Plan Factors-Exercise tolerance (METS): >4 METS.    Chart reviewed.    Patient summary reviewed.    Patient is a current smoker.              Induction- intravenous.    Postoperative Plan-         Informed Consent- Anesthetic plan and risks discussed with patient.  I personally reviewed this patient with the CRNA. Discussed and agreed on the Anesthesia Plan with the CRNA..

## 2025-01-09 NOTE — ANESTHESIA POSTPROCEDURE EVALUATION
Post-Op Assessment Note    CV Status:  Stable  Pain Score: 0    Pain management: adequate       Mental Status:  Arousable   Hydration Status:  Stable   PONV Controlled:  Controlled   Airway Patency:  Patent  Two or more mitigation strategies used for obstructive sleep apnea   Post Op Vitals Reviewed: Yes    No anethesia notable event occurred.    Staff: CRNA           Last Filed PACU Vitals:  Vitals Value Taken Time   Temp 97.1 °F (36.2 °C) 01/09/25 1032   Pulse 57 01/09/25 1032   /79 01/09/25 1032   Resp 16 01/09/25 1032   SpO2 98 % 01/09/25 1032       Modified Luciana:     Vitals Value Taken Time   Activity 1 01/09/25 1032   Respiration 2 01/09/25 1032   Circulation 2 01/09/25 1032   Consciousness 1 01/09/25 1032   Oxygen Saturation 2 01/09/25 1032     Modified Luciana Score: 8

## 2025-01-14 ENCOUNTER — RESULTS FOLLOW-UP (OUTPATIENT)
Dept: GASTROENTEROLOGY | Facility: CLINIC | Age: 70
End: 2025-01-14

## 2025-01-14 PROCEDURE — 88341 IMHCHEM/IMCYTCHM EA ADD ANTB: CPT | Performed by: PATHOLOGY

## 2025-01-14 PROCEDURE — 88342 IMHCHEM/IMCYTCHM 1ST ANTB: CPT | Performed by: PATHOLOGY

## 2025-01-14 PROCEDURE — 88305 TISSUE EXAM BY PATHOLOGIST: CPT | Performed by: PATHOLOGY

## 2025-01-14 PROCEDURE — 88312 SPECIAL STAINS GROUP 1: CPT | Performed by: PATHOLOGY

## 2025-03-27 DIAGNOSIS — K20.90 ESOPHAGITIS: ICD-10-CM

## 2025-03-27 RX ORDER — PANTOPRAZOLE SODIUM 40 MG/1
40 TABLET, DELAYED RELEASE ORAL
Qty: 60 TABLET | Refills: 5 | Status: SHIPPED | OUTPATIENT
Start: 2025-03-27 | End: 2025-04-26

## 2025-03-28 NOTE — TELEPHONE ENCOUNTER
Patient called requesting refill for pantoprazole. Patient made aware medication was refilled on 3/27/25 for 60 with 5 refills to [facility]. Patient instructed to contact the pharmacy to obtain refills of medication. Patient verbalized understanding.

## (undated) DEVICE — TUBING SUCTION 5MM X 12 FT

## (undated) DEVICE — 10FR FRAZIER SUCTION HANDLE: Brand: CARDINAL HEALTH

## (undated) DEVICE — GLOVE SRG BIOGEL 8

## (undated) DEVICE — ACE WRAP 4 IN UNSTERILE

## (undated) DEVICE — SUT VICRYL 2-0 SH 27 IN UNDYED J417H

## (undated) DEVICE — CYSTO TUBING SINGLE IRRIGATION

## (undated) DEVICE — SUT MONOCRYL 2-0 SH 27 IN Y417H

## (undated) DEVICE — CAST PLASTER 4 IN ROLL

## (undated) DEVICE — ADHESIVE SKIN HIGH VISCOSITY EXOFIN 1ML

## (undated) DEVICE — CUFF TOURNIQUET 18 X 4 IN QUICK CONNECT DISP 1 BLADDER

## (undated) DEVICE — U-DRAPE: Brand: CONVERTORS

## (undated) DEVICE — DRILL, AO, SCALED: Brand: VARIAX

## (undated) DEVICE — SUT MONOCRYL 3-0 PS-2 18 IN Y497G

## (undated) DEVICE — PAD CAST 6 IN COTTON NON STERILE

## (undated) DEVICE — CUFF TOURNIQUET 18 X 5.5 IN QUICK CONNECT 2BLA

## (undated) DEVICE — ACE WRAP 3 IN STERILE

## (undated) DEVICE — INTENDED FOR TISSUE SEPARATION, AND OTHER PROCEDURES THAT REQUIRE A SHARP SURGICAL BLADE TO PUNCTURE OR CUT.: Brand: BARD-PARKER ® CARBON RIB-BACK BLADES

## (undated) DEVICE — SUT PDS II 0 CT-2 27 IN Z334H

## (undated) DEVICE — PADDING CAST 4 IN  COTTON STRL

## (undated) DEVICE — DISPOSABLE EQUIPMENT COVER: Brand: SMALL TOWEL DRAPE

## (undated) DEVICE — STERILE BETHLEHEM PLASTIC HAND: Brand: CARDINAL HEALTH

## (undated) DEVICE — GLOVE INDICATOR PI UNDERGLOVE SZ 8 BLUE

## (undated) DEVICE — GLOVE SRG BIOGEL 7.5

## (undated) DEVICE — COBAN 4 IN STERILE

## (undated) DEVICE — DRAPE C-ARM X-RAY

## (undated) DEVICE — SUT ETHILON 3-0 PS-1 18 IN 1663H

## (undated) DEVICE — ACE WRAP 6 IN UNSTERILE

## (undated) DEVICE — DRESSING XEROFORM 5 X 9

## (undated) DEVICE — OCCLUSIVE GAUZE STRIP,3% BISMUTH TRIBROMOPHENATE IN PETROLATUM BLEND: Brand: XEROFORM

## (undated) DEVICE — GAUZE SPONGES,16 PLY: Brand: CURITY

## (undated) DEVICE — NEPTUNE E-SEP SMOKE EVACUATION PENCIL, COATED, 70MM BLADE, PUSH BUTTON SWITCH: Brand: NEPTUNE E-SEP

## (undated) DEVICE — DRESSING MEPILEX AG BORDER POST-OP 4 X 6 IN

## (undated) DEVICE — LIGHT HANDLE COVER SLEEVE DISP BLUE STELLAR

## (undated) DEVICE — ARM SLING: Brand: DEROYAL

## (undated) DEVICE — MAYO STAND COVER: Brand: CONVERTORS

## (undated) DEVICE — BANDAGE, ESMARK LF STR 4"X9'(20/CS): Brand: CYPRESS

## (undated) DEVICE — SUT VICRYL 1 CT-1 27 IN J261H

## (undated) DEVICE — ABDOMINAL PAD: Brand: DERMACEA

## (undated) DEVICE — 3M™ STERI-STRIP™ REINFORCED ADHESIVE SKIN CLOSURES, R1547, 1/2 IN X 4 IN (12 MM X 100 MM), 6 STRIPS/ENVELOPE: Brand: 3M™ STERI-STRIP™